# Patient Record
Sex: FEMALE | Race: BLACK OR AFRICAN AMERICAN | Employment: PART TIME | URBAN - METROPOLITAN AREA
[De-identification: names, ages, dates, MRNs, and addresses within clinical notes are randomized per-mention and may not be internally consistent; named-entity substitution may affect disease eponyms.]

---

## 2017-01-17 ENCOUNTER — LAB ONLY (OUTPATIENT)
Dept: FAMILY MEDICINE CLINIC | Age: 52
End: 2017-01-17

## 2017-01-17 ENCOUNTER — HOSPITAL ENCOUNTER (OUTPATIENT)
Dept: LAB | Age: 52
Discharge: HOME | End: 2017-01-17

## 2017-01-17 DIAGNOSIS — I10 ESSENTIAL HYPERTENSION WITH GOAL BLOOD PRESSURE LESS THAN 140/90: Primary | ICD-10-CM

## 2017-01-17 DIAGNOSIS — E55.9 VITAMIN D DEFICIENCY: ICD-10-CM

## 2017-01-17 DIAGNOSIS — E78.5 DYSLIPIDEMIA: ICD-10-CM

## 2017-01-17 DIAGNOSIS — I10 ESSENTIAL HYPERTENSION WITH GOAL BLOOD PRESSURE LESS THAN 140/90: ICD-10-CM

## 2017-01-17 LAB
ALBUMIN SERPL BCP-MCNC: 3.6 G/DL (ref 3.4–5)
ALBUMIN/GLOB SERPL: 1 {RATIO} (ref 0.8–1.7)
ALP SERPL-CCNC: 76 U/L (ref 45–117)
ALT SERPL-CCNC: 16 U/L (ref 13–56)
ANION GAP BLD CALC-SCNC: 7 MMOL/L (ref 3–18)
AST SERPL W P-5'-P-CCNC: 7 U/L (ref 15–37)
BASOPHILS # BLD AUTO: 0 K/UL (ref 0–0.1)
BASOPHILS # BLD: 0 % (ref 0–2)
BILIRUB SERPL-MCNC: 0.2 MG/DL (ref 0.2–1)
BUN SERPL-MCNC: 9 MG/DL (ref 7–18)
BUN/CREAT SERPL: 12 (ref 12–20)
CALCIUM SERPL-MCNC: 9 MG/DL (ref 8.5–10.1)
CHLORIDE SERPL-SCNC: 108 MMOL/L (ref 100–108)
CHOLEST SERPL-MCNC: 208 MG/DL
CO2 SERPL-SCNC: 26 MMOL/L (ref 21–32)
CREAT SERPL-MCNC: 0.73 MG/DL (ref 0.6–1.3)
DIFFERENTIAL METHOD BLD: ABNORMAL
EOSINOPHIL # BLD: 0.2 K/UL (ref 0–0.4)
EOSINOPHIL NFR BLD: 2 % (ref 0–5)
ERYTHROCYTE [DISTWIDTH] IN BLOOD BY AUTOMATED COUNT: 14.4 % (ref 11.6–14.5)
GLOBULIN SER CALC-MCNC: 3.5 G/DL (ref 2–4)
GLUCOSE SERPL-MCNC: 82 MG/DL (ref 74–99)
HCT VFR BLD AUTO: 36 % (ref 35–45)
HDLC SERPL-MCNC: 62 MG/DL (ref 40–60)
HDLC SERPL: 3.4 {RATIO} (ref 0–5)
HGB BLD-MCNC: 11.4 G/DL (ref 12–16)
LDLC SERPL CALC-MCNC: 118.4 MG/DL (ref 0–100)
LIPID PROFILE,FLP: ABNORMAL
LYMPHOCYTES # BLD AUTO: 22 % (ref 21–52)
LYMPHOCYTES # BLD: 1.6 K/UL (ref 0.9–3.6)
MCH RBC QN AUTO: 27.7 PG (ref 24–34)
MCHC RBC AUTO-ENTMCNC: 31.7 G/DL (ref 31–37)
MCV RBC AUTO: 87.4 FL (ref 74–97)
MONOCYTES # BLD: 0.4 K/UL (ref 0.05–1.2)
MONOCYTES NFR BLD AUTO: 5 % (ref 3–10)
NEUTS SEG # BLD: 5.3 K/UL (ref 1.8–8)
NEUTS SEG NFR BLD AUTO: 71 % (ref 40–73)
PLATELET # BLD AUTO: 293 K/UL (ref 135–420)
PMV BLD AUTO: 11.1 FL (ref 9.2–11.8)
POTASSIUM SERPL-SCNC: 4.2 MMOL/L (ref 3.5–5.5)
PROT SERPL-MCNC: 7.1 G/DL (ref 6.4–8.2)
RBC # BLD AUTO: 4.12 M/UL (ref 4.2–5.3)
SODIUM SERPL-SCNC: 141 MMOL/L (ref 136–145)
TRIGL SERPL-MCNC: 138 MG/DL (ref ?–150)
VLDLC SERPL CALC-MCNC: 27.6 MG/DL
WBC # BLD AUTO: 7.5 K/UL (ref 4.6–13.2)

## 2017-01-17 PROCEDURE — 82306 VITAMIN D 25 HYDROXY: CPT | Performed by: NURSE PRACTITIONER

## 2017-01-17 PROCEDURE — 80061 LIPID PANEL: CPT | Performed by: NURSE PRACTITIONER

## 2017-01-17 PROCEDURE — 80053 COMPREHEN METABOLIC PANEL: CPT | Performed by: NURSE PRACTITIONER

## 2017-01-17 PROCEDURE — 85025 COMPLETE CBC W/AUTO DIFF WBC: CPT | Performed by: NURSE PRACTITIONER

## 2017-01-17 NOTE — PROGRESS NOTES
Patient name, date of birth and orders verified before specimen collection. Left  arm is dry and intact. 21g butterfly  was utilized to collect specimen. Pt tolerated procedure well.

## 2017-01-18 LAB — 25(OH)D3 SERPL-MCNC: 24 NG/ML (ref 30–100)

## 2017-01-18 NOTE — PROGRESS NOTES
Will review results with pt at 1/25/17 appt  1. BMP OK   2.  LIpid panel elevated: , Trig 138, HDL 32, .4

## 2017-01-25 ENCOUNTER — OFFICE VISIT (OUTPATIENT)
Dept: FAMILY MEDICINE CLINIC | Age: 52
End: 2017-01-25

## 2017-01-25 VITALS
WEIGHT: 215.4 LBS | HEART RATE: 72 BPM | BODY MASS INDEX: 35.89 KG/M2 | OXYGEN SATURATION: 96 % | TEMPERATURE: 98.5 F | HEIGHT: 65 IN | RESPIRATION RATE: 16 BRPM | SYSTOLIC BLOOD PRESSURE: 137 MMHG | DIASTOLIC BLOOD PRESSURE: 82 MMHG

## 2017-01-25 DIAGNOSIS — Z23 ENCOUNTER FOR IMMUNIZATION: ICD-10-CM

## 2017-01-25 DIAGNOSIS — H53.8 BLURRED VISION: ICD-10-CM

## 2017-01-25 DIAGNOSIS — G47.9 SLEEPING DIFFICULTY: ICD-10-CM

## 2017-01-25 DIAGNOSIS — I10 ESSENTIAL HYPERTENSION WITH GOAL BLOOD PRESSURE LESS THAN 140/90: Primary | ICD-10-CM

## 2017-01-25 DIAGNOSIS — E66.9 OBESITY (BMI 30-39.9): ICD-10-CM

## 2017-01-25 DIAGNOSIS — E78.5 DYSLIPIDEMIA: ICD-10-CM

## 2017-01-25 DIAGNOSIS — E55.9 VITAMIN D DEFICIENCY: ICD-10-CM

## 2017-01-25 DIAGNOSIS — J45.20 WELL CONTROLLED INTERMITTENT ASTHMA: ICD-10-CM

## 2017-01-25 RX ORDER — ALBUTEROL SULFATE 90 UG/1
2 AEROSOL, METERED RESPIRATORY (INHALATION)
Qty: 3 INHALER | Refills: 3 | Status: SHIPPED | COMMUNITY
Start: 2017-01-25 | End: 2018-10-11 | Stop reason: SDUPTHER

## 2017-01-25 RX ORDER — AMLODIPINE BESYLATE AND ATORVASTATIN CALCIUM 10; 10 MG/1; MG/1
1 TABLET, FILM COATED ORAL DAILY
Qty: 90 TAB | Refills: 3 | Status: SHIPPED | COMMUNITY
Start: 2017-01-25 | End: 2018-10-11

## 2017-01-25 RX ORDER — ALBUTEROL SULFATE 90 UG/1
2 AEROSOL, METERED RESPIRATORY (INHALATION)
Qty: 6 INHALER | Refills: 3 | Status: SHIPPED | COMMUNITY
Start: 2017-01-25

## 2017-01-25 NOTE — LETTER
1/25/2017 [address] [facility] [address] josé manuel Eddy, 1965, is picking up the following medications ordered from the [facility]. 
 
SAMPLE: CADUET 10-10 MG QUANTITY: 30 & SAMPLE: VENTOLIN HFA QUANTITY: 2 Jd Gibson Patient's Signature:_________________________________________________ Today's Date: 1/25/2017

## 2017-01-25 NOTE — PROGRESS NOTES
MARILYNN LOPEZ [facility]  [address], [address]  [phone] office/[phone] fax      2017    Reason for visit:   Chief Complaint   Patient presents with    Results    Medication Evaluation     out of blood pressure meds for about 2 months. Not taking Brintellix    Immunization/Injection       Patient: Benja Shoemaker, 1965, [id number]       Primary MD: Jasper Parekh NP    Subjective: Benja Shoemaker, a 49 y.o. female, who presents for Results; Medication Evaluation (out of blood pressure meds for about 2 months. Not taking Brintellix); and Immunization/Injection      HPI    Past Medical History   Diagnosis Date    Anemia     Asthma     BMI 36.0-36.9,adult 2015    Depression     Essential hypertension with goal blood pressure less than 140/90 2016    GERD (gastroesophageal reflux disease)     H. pylori infection 4/7/15    H/O pulmonary function tests  2015      normal    Hypercholesterolemia     Hypertension     Migraine      denies    Second hand smoke exposure 2016    Sleep apnea 2016    Vitamin D deficiency 4/7/15       Past Surgical History   Procedure Laterality Date    Hx  section       x 3    Hx hysterectomy      Hx hernia repair       umbilical    Hx colonoscopy  8/26/15     Dr. Yovanny Parham, Colorectal       Social History     Social History    Marital status: SINGLE     Spouse name: N/A    Number of children: N/A    Years of education: N/A     Occupational History    Not on file.      Social History Main Topics    Smoking status: Never Smoker    Smokeless tobacco: Never Used    Alcohol use No    Drug use: No    Sexual activity: Yes     Partners: Male     Other Topics Concern    Not on file     Social History Narrative       Allergies   Allergen Reactions    Pcn [Penicillins] Rash    Adhesive Rash    Lisinopril Cough    Motrin [Ibuprofen] Shortness of Breath       Current Outpatient Prescriptions on File Prior to Visit   Medication Sig Dispense Refill    calcium combo no.2-vitamin D3 (CITRACAL + D SLOW RELEASE) 600 mg calcium- 500 unit TbER Take 1 Tab by mouth two (2) times a day. 60 Tab 11     No current facility-administered medications on file prior to visit. Review of Systems   Constitutional: Negative. HENT: Negative. Eyes: Positive for blurred vision (On and off. Has glasses but doesnt wear them ). Respiratory: Negative. Cardiovascular: Negative. Gastrointestinal: Negative. Genitourinary: Negative. Musculoskeletal: Negative. Neurological: Negative. Negative for headaches. Endo/Heme/Allergies: Negative. Psychiatric/Behavioral: Negative for depression. The patient has insomnia (Get up twice a night. Gets 6 hours of sleep ). Objective:   Visit Vitals    /82    Pulse 72    Temp 98.5 °F (36.9 °C)    Resp 16    Ht 5' 5\" (1.651 m)    Wt 215 lb 6.4 oz (97.7 kg)    SpO2 96%    BMI 35.84 kg/m2      Wt Readings from Last 3 Encounters:   01/25/17 215 lb 6.4 oz (97.7 kg)   09/15/16 200 lb (90.7 kg)   02/08/16 219 lb (99.3 kg)     Lab Results   Component Value Date/Time    Glucose 82 01/17/2017 10:31 AM    Glucose (POC) 96 06/06/2012 09:40 AM         Physical Exam   Constitutional: She is oriented to person, place, and time. She appears well-developed and well-nourished. HENT:   Head: Normocephalic. Eyes: Pupils are equal, round, and reactive to light. Neck: Normal range of motion. Neck supple. No JVD present. Carotid bruit is not present. Cardiovascular: Normal rate, regular rhythm, normal heart sounds and intact distal pulses. No murmur heard. Pulmonary/Chest: Effort normal and breath sounds normal. No respiratory distress. Abdominal: Soft. Bowel sounds are normal.   Musculoskeletal: Normal range of motion. Neurological: She is alert and oriented to person, place, and time. She has normal reflexes. Skin: Skin is warm and dry. Psychiatric: She has a normal mood and affect. Her behavior is normal.   Vitals reviewed. Assessment:    Benja Shoemaker who has risk factors including (see above previous medical hx) and:       ICD-10-CM ICD-9-CM    1. Essential hypertension with goal blood pressure less than 140/90 I10 401.9 amLODIPine-atorvastatin (CADUET) 10-10 mg per tablet      amLODIPine-atorvastatin (CADUET) 10-10 mg per tablet   2. Encounter for immunization Z23 V03.89 INFLUENZA VIRUS VAC QUAD,SPLIT,PRESV FREE SYRINGE 3/> YRS IM      VT IMMUNIZ ADMIN,1 SINGLE/COMB VAC/TOXOID   3. Obesity (BMI 30-39. 9) E66.9 278.00    4. Dyslipidemia E78.5 272.4 amLODIPine-atorvastatin (CADUET) 10-10 mg per tablet      amLODIPine-atorvastatin (CADUET) 10-10 mg per tablet   5. Blurred vision H53.8 368.8    6. Vitamin D deficiency E55.9 268.9    7. Well controlled intermittent asthma J45.20 493.90 albuterol (PROVENTIL HFA, VENTOLIN HFA, PROAIR HFA) 90 mcg/actuation inhaler      albuterol (PROVENTIL HFA, VENTOLIN HFA, PROAIR HFA) 90 mcg/actuation inhaler   8. Sleeping difficulty G47.9 780.50 diphenhydrAMINE (BENADRYL) 12.5 mg/5 mL     1. Essential hypertension with goal blood pressure less than 140/90  -The patient out of [facility] and there are no samples of it here. Will do Caduet since cholesterol slightly elevated for prevention. - amLODIPine-atorvastatin (CADUET) 10-10 mg per tablet; Take 1 Tab by mouth daily. Dispense: 90 Tab; Refill: 3  - amLODIPine-atorvastatin (CADUET) 10-10 mg per tablet; Take 1 Tab by mouth daily. Dispense: 90 Tab; Refill: 3    2. Encounter for immunization  - Influenza virus vaccine (QUADRIVALENT PRES FREE SYRINGE) IM 3 years and older  - VT IMMUNIZ ADMIN,1 SINGLE/COMB VAC/TOXOID    3.  Obesity (BMI 30-39.9)  -Weight management: Discussed importance of maintaining a normal weight through healthy dietary changes and aerobic exercise on a daily basis of 30 min or more to decrease need for additional medications, reduction of blood glucose/blood pressure/ dementia/osteoporosis/stress and depression. Aerobic exercise was described as an activity that makes them sweaty and elevates their heart rate such as walking, running, bike, treadmill, stair climbing, joining a gym or swimming. Discussed the patient's above normal BMI with her. Recommended the following interventions: dietary management education, guidance, counseling, exercise and monitoring weight. BMI is out of normal parameters and plan is as follows: Discussed in great detail on diet, portion control, exercise, avoiding foods high in sugar, carbs and starches, mealtimes and to eat until satisfied not til full. I have counseled this patient on diet and exercise regimens      4. Dyslipidemia  -Reviewed current value and goal related to prevention level, discussed dietary changes such as low saturated fats and low simple carbohydrates and choosing lean proteins such grilled/broiled/baked fish, chicken or turkey. Medications reviewed with the correct way to take them and side effects that should be reported such as muscle aches, pain, rash or shortness of breath. - amLODIPine-atorvastatin (CADUET) 10-10 mg per tablet; Take 1 Tab by mouth daily. Dispense: 90 Tab; Refill: 3  - amLODIPine-atorvastatin (CADUET) 10-10 mg per tablet; Take 1 Tab by mouth daily. Dispense: 90 Tab; Refill: 3    5. Blurred vision  -Instructed the patient to wear glasses as prescribed and follow up with optometrist if not resolved    6. Vitamin D deficiency  -Your body needs vitamin D to absorb calcium. Calcium keeps your bones and muscles, including your heart, healthy and strong. If your muscles don't get enough calcium, they can cramp, hurt, or feel weak. You may have long-term (chronic) muscle aches and pains. If you don't get enough vitamin D throughout life, you have an increased chance of having thin and brittle bones (osteoporosis) in your later years.  They also have a chance of getting a rare disease called rickets. It causes weak bones. Vitamin D and calcium are added to many foods. And your body uses sunshine to make its own vitamin D.      7. Well controlled intermittent asthma  - albuterol (PROVENTIL HFA, VENTOLIN HFA, PROAIR HFA) 90 mcg/actuation inhaler; Take 2 Puffs by inhalation every four (4) hours as needed for Wheezing. Dispense: 3 Inhaler; Refill: 3  - albuterol (PROVENTIL HFA, VENTOLIN HFA, PROAIR HFA) 90 mcg/actuation inhaler; Take 2 Puffs by inhalation every four (4) hours as needed for Wheezing. Dispense: 6 Inhaler; Refill: 3    8. Sleeping difficulty  - diphenhydrAMINE (BENADRYL) 12.5 mg/5 mL; Take 10 mL by mouth nightly as needed for Itching. Indications: INSOMNIA  Dispense: 1 Bottle; Refill: 0      Written instructions followed our verbal discussion of all information discussed above, pending tests ordered and future goals/plans. Patient expressed understanding of current diagnosis, planned testing, follow up and if needed to contact the office for any questions or concerns prior to the next visit. Plan:   Med reconciliation completed with patient. Reviewed side effects of medications with the patient. Questions were answered and patient verb understanding. Discussed lab results with patient  1. BMP OK   2. LIpid panel elevated: , Trig 138, HDL 32, .4     Orders Placed This Encounter    Influenza virus vaccine (QUADRIVALENT PRES FREE SYRINGE) IM 3 years and older    [place] IMMUNIZ ADMIN,1 SINGLE/COMB VAC/TOXOID    amLODIPine-atorvastatin (CADUET) 10-10 mg per tablet     Sig: Take 1 Tab by mouth daily. Dispense:  90 Tab     Refill:  3    amLODIPine-atorvastatin (CADUET) 10-10 mg per tablet     Sig: Take 1 Tab by mouth daily. Dispense:  90 Tab     Refill:  3    albuterol (PROVENTIL HFA, VENTOLIN HFA, PROAIR HFA) 90 mcg/actuation inhaler     Sig: Take 2 Puffs by inhalation every four (4) hours as needed for Wheezing.      Dispense:  3 Inhaler     Refill: 3    albuterol (PROVENTIL HFA, VENTOLIN HFA, PROAIR HFA) 90 mcg/actuation inhaler     Sig: Take 2 Puffs by inhalation every four (4) hours as needed for Wheezing. Dispense:  6 Inhaler     Refill:  3    diphenhydrAMINE (BENADRYL) 12.5 mg/5 mL     Sig: Take 10 mL by mouth nightly as needed for Itching. Indications: INSOMNIA     Dispense:  1 Bottle     Refill:  0     Current Outpatient Prescriptions   Medication Sig Dispense Refill    amLODIPine-atorvastatin (CADUET) 10-10 mg per tablet Take 1 Tab by mouth daily. 90 Tab 3    amLODIPine-atorvastatin (CADUET) 10-10 mg per tablet Take 1 Tab by mouth daily. 90 Tab 3    albuterol (PROVENTIL HFA, VENTOLIN HFA, PROAIR HFA) 90 mcg/actuation inhaler Take 2 Puffs by inhalation every four (4) hours as needed for Wheezing. 3 Inhaler 3    albuterol (PROVENTIL HFA, VENTOLIN HFA, PROAIR HFA) 90 mcg/actuation inhaler Take 2 Puffs by inhalation every four (4) hours as needed for Wheezing. 6 Inhaler 3    diphenhydrAMINE (BENADRYL) 12.5 mg/5 mL Take 10 mL by mouth nightly as needed for Itching. Indications: INSOMNIA 1 Bottle 0    calcium combo no.2-vitamin D3 (CITRACAL + D SLOW RELEASE) 600 mg calcium- 500 unit TbER Take 1 Tab by mouth two (2) times a day. 60 Tab 11     Medications Discontinued During This Encounter   Medication Reason    vortioxetine (BRINTELLIX) 10 mg tablet Not A Current Medication    olmesartan (BENICAR) 20 mg tablet Not A Current Medication    ergocalciferol (VITAMIN D2) 50,000 unit capsule Not A Current Medication    albuterol (PROVENTIL HFA, VENTOLIN HFA, PROAIR HFA) 90 mcg/actuation inhaler Reorder    dexlansoprazole (DEXILANT) 30 mg capsule Not A Current Medication    traZODone (DESYREL) 50 mg tablet Not A Current Medication    desloratadine (CLARINEX) 5 mg tablet Not A Current Medication       Follow-up Disposition:  Return in about 6 months (around 7/25/2017), or if symptoms worsen or fail to improve.     Labs needed for follow-up appt    \"No Show policy was reviewed with the patient. The services affected are the nurse navigator and the provider. No show appointments include missing labs for a future scheduled appointment, Pap/pelvics, arriving to appointment more than 10 minutes late, and calling to cancel appointment less than 24 hours in advance. After the 6th No Show, the patient will be removed from the [facility] to include medications for 6 months. The patient will be referred to the [place] [id number] for their primary care needs. \"     Bj Cantor, [address] I spent 30 minutes with the patient in face-to-face consultation, of which greater than 50% was spent in counseling and coordination of care as described above.

## 2017-01-25 NOTE — MR AVS SNAPSHOT
Visit Information Date & Time Provider Department Dept. Phone Encounter #  
 1/25/2017 10:00 AM Jasper Parekh NP [address] [facility] [id number] Follow-up Instructions Return in about 6 months (around 7/25/2017), or if symptoms worsen or fail to improve. Your Appointments 1/25/2017 10:00 AM  
Follow Up with Jasper Parekh NP 269[address] [facility] ([address]) Appt Note: follow up  
 [address] [facility] [address]  
[address] [facility] [address] Upcoming Health Maintenance Date Due DTaP/Tdap/Td series (1 - Tdap) 1/27/1987 FOBT Q 1 YEAR AGE 50-75 1/27/2016 INFLUENZA AGE 9 TO ADULT 1/26/2017* Pneumococcal 19-64 Medium Risk (1 of 1 - PPSV23) 1/25/2018* BREAST CANCER SCRN MAMMOGRAM 6/4/2017 PAP AKA CERVICAL CYTOLOGY 4/16/2018 *Topic was postponed. The date shown is not the original due date. Allergies as of 1/25/2017  Review Complete On: 1/25/2017 By: Vernon Paige Severity Noted Reaction Type Reactions Pcn [Penicillins] High 03/13/2012   Side Effect Rash Adhesive  01/25/2017    Rash Lisinopril  07/19/2016    Cough Motrin [Ibuprofen]  03/14/2012    Shortness of Breath Current Immunizations  Reviewed on 1/11/2016 Name Date Influenza Vaccine 1/7/2015  3:43 PM  
 Influenza Vaccine (Quad) PF  Incomplete, 1/11/2016 Not reviewed this visit You Were Diagnosed With   
  
 Codes Comments Essential hypertension with goal blood pressure less than 140/90    -  Primary ICD-10-CM: I10 
ICD-9-CM: 401.9 Encounter for immunization     ICD-10-CM: A92 ICD-9-CM: V03.89 Obesity (BMI 30-39. 9)     ICD-10-CM: E66.9 ICD-9-CM: 278.00 Dyslipidemia     ICD-10-CM: E78.5 ICD-9-CM: 272.4 Blurred vision     ICD-10-CM: H53.8 ICD-9-CM: 368.8 Vitamin D deficiency     ICD-10-CM: E55.9 ICD-9-CM: 268.9 Well controlled intermittent asthma     ICD-10-CM: J45.20 ICD-9-CM: 493.90 Sleeping difficulty     ICD-10-CM: G47.9 ICD-9-CM: 780.50 Vitals BP Pulse Temp Resp Height(growth percentile) Weight(growth percentile) 137/82 72 98.5 °F (36.9 °C) 16 5' 5\" (1.651 m) 215 lb 6.4 oz (97.7 kg) SpO2 BMI OB Status Smoking Status 96% 35.84 kg/m2 Hysterectomy Never Smoker Vitals History BMI and BSA Data Body Mass Index Body Surface Area  
 35.84 kg/m 2 2.12 m 2 Preferred Pharmacy Pharmacy Name Phone [facility] [address] [phone] Your Updated Medication List  
  
   
This list is accurate as of: 1/25/17  9:41 AM.  Always use your most recent med list.  
  
  
  
  
 * albuterol 90 mcg/actuation inhaler Commonly known as:  PROVENTIL HFA, VENTOLIN HFA, PROAIR HFA Take 2 Puffs by inhalation every four (4) hours as needed for Wheezing. * albuterol 90 mcg/actuation inhaler Commonly known as:  PROVENTIL HFA, VENTOLIN HFA, PROAIR HFA Take 2 Puffs by inhalation every four (4) hours as needed for Wheezing. * amLODIPine-atorvastatin 10-10 mg per tablet Commonly known as:  CADUET Take 1 Tab by mouth daily. * amLODIPine-atorvastatin 10-10 mg per tablet Commonly known as:  CADUET Take 1 Tab by mouth daily. calcium combo no.2-vitamin D3 600 mg calcium- 500 unit Tber Commonly known as:  CITRACAL + D SLOW RELEASE Take 1 Tab by mouth two (2) times a day. diphenhydrAMINE 12.5 mg/5 mL Commonly known as:  BENADRYL Take 10 mL by mouth nightly as needed for Itching. Indications: INSOMNIA * Notice: This list has 4 medication(s) that are the same as other medications prescribed for you. Read the directions carefully, and ask your doctor or other care provider to review them with you. Prescriptions Printed Refills  
 amLODIPine-atorvastatin (CADUET) 10-10 mg per tablet 3 Sig: Take 1 Tab by mouth daily. Class: Program  
 Route: Oral  
 albuterol (PROVENTIL HFA, VENTOLIN HFA, PROAIR HFA) 90 mcg/actuation inhaler 3 Sig: Take 2 Puffs by inhalation every four (4) hours as needed for Wheezing. Class: Program  
 Route: Inhalation Prescriptions Sent to Mail Order Refills  
 amLODIPine-atorvastatin (CADUET) 10-10 mg per tablet 3 Sig: Take 1 Tab by mouth daily. Class: Program  
 Pharmacy: [address], [address] [place] [address]. Ph #: [phone] Route: Oral  
 albuterol (PROVENTIL HFA, VENTOLIN HFA, PROAIR HFA) 90 mcg/actuation inhaler 3 Sig: Take 2 Puffs by inhalation every four (4) hours as needed for Wheezing. Class: Program  
 Pharmacy: [address], [address] [place] [address]. Ph #: [phone] Route: Inhalation Prescriptions Sent to Pharmacy Refills  
 amLODIPine-atorvastatin (CADUET) 10-10 mg per tablet 3 Sig: Take 1 Tab by mouth daily. Class: Program  
 Pharmacy: [address], [address] [place] [address]. Ph #: [phone] Route: Oral  
 albuterol (PROVENTIL HFA, VENTOLIN HFA, PROAIR HFA) 90 mcg/actuation inhaler 3 Sig: Take 2 Puffs by inhalation every four (4) hours as needed for Wheezing. Class: Program  
 Pharmacy: [address], [address] [place] [address]. Ph #: [phone] Route: Inhalation  
 diphenhydrAMINE (BENADRYL) 12.5 mg/5 mL 0 Sig: Take 10 mL by mouth nightly as needed for Itching. Indications: INSOMNIA Class: Normal  
 Pharmacy: [address], [facility]. Ph #: [phone] Route: Oral  
  
We Performed the Following INFLUENZA VIRUS VAC QUAD,SPLIT,PRESV FREE SYRINGE 3/> YRS IM [id number] CPT(R)] OR IMMUNIZ ADMIN,1 SINGLE/COMB VAC/TOXOID [id number] CPT(R)] Follow-up Instructions Return in about 6 months (around 7/25/2017), or if symptoms worsen or fail to improve. Patient Instructions High Cholesterol: Care Instructions Your Care Instructions Cholesterol is a type of fat in your blood. It is needed for many body functions, such as making new cells. Cholesterol is made by your body. It also comes from food you eat. High cholesterol means that you have too much of the fat in your blood. This raises your risk of a heart attack and stroke. LDL and HDL are part of your total cholesterol. LDL is the \"bad\" cholesterol. High LDL can raise your risk for heart disease, heart attack, and stroke. HDL is the \"good\" cholesterol. It helps clear bad cholesterol from the body. High HDL is linked with a lower risk of heart disease, heart attack, and stroke. Your cholesterol levels help your doctor find out your risk for having a heart attack or stroke. You and your doctor can talk about whether you need to lower your risk and what treatment is best for you. A heart-healthy lifestyle along with medicines can help lower your cholesterol and your risk. The way you choose to lower your risk will depend on how high your risk is for heart attack and stroke. It will also depend on how you feel about taking medicines. Follow-up care is a key part of your treatment and safety. Be sure to make and go to all appointments, and call your doctor if you are having problems. It's also a good idea to know your test results and keep a list of the medicines you take. How can you care for yourself at home? · Eat a variety of foods every day. Good choices include fruits, vegetables, whole grains (like oatmeal), dried beans and peas, nuts and seeds, soy products (like tofu), and fat-free or low-fat dairy products. · Replace butter, margarine, and hydrogenated or partially hydrogenated oils with olive and canola oils. (Canola oil margarine without trans fat is fine.) · Replace red meat with fish, poultry, and soy protein (like tofu). · Limit processed and packaged foods like chips, crackers, and cookies. · Bake, broil, or steam foods. Don't murdock them. · Be physically active. Get at least 30 minutes of exercise on most days of the week. Walking is a good choice. You also may want to do other activities, such as running, swimming, cycling, or playing tennis or team sports. · Stay at a healthy weight or lose weight by making the changes in eating and physical activity listed above. Losing just a small amount of weight, even 5 to 10 pounds, can reduce your risk for having a heart attack or stroke. · Do not smoke. When should you call for help? Watch closely for changes in your health, and be sure to contact your doctor if: 
· You need help making lifestyle changes. · You have questions about your medicine. Where can you learn more? Go to http://yaniraBobber Interactive Corporationdeysi.info/. Enter E219 in the search box to learn more about \"High Cholesterol: Care Instructions. \" Current as of: January 27, 2016 Content Version: 11.1 © 5059-2640 [employer]. Care instructions adapted under license by [employer] (which disclaims liability or warranty for this information). If you have questions about a medical condition or this instruction, always ask your healthcare professional. [address] any warranty or liability for your use of this information. The [facility] reminders! Foundation Operating Hours: These may change without notice. Mon- Wed 7am to 5pm. Closed for lunch 12-1pm 
Thurs 7am to 12pm 
Fridays closed NO SHOW POLICY ~ If a patient has 3 no shows for an appointment with the Provider, Mental Health Provider, or the Nurse Navigator in 6 months, they will be discharged from the practice for 6 months. Medication ordering will also be suspended.  If the patient is discharged from the [place], they can go to the [place] [address] where they can be seen for the primary needs plus obtain the same types of medications as they receive at the [facility]. To avoid being discharged, the patient must call the office at [phone] 24 hours prior to their appointment if they need to cancel, arrive to their appointments on time and come to all scheduled appointments. If the patient is discharged from the practice, they can apply to be re-established after 12 months. Lab work:  Unless you are instructed differently, please return to the office between the hours of 7 am and 10:30 am Monday through Thursday to have your labs drawn one week before your next scheduled PROVIDER visit. If you do not have an appointment to follow up on these results, please make one or plan to call the office if you do not hear from us to get the results. No news does not mean good news. Medications: If your medications are new or have changed, and you get your medications from the clinic pharmacy ([facility]), you MUST talk to the pharmacy staff to sign the new prescription applications. If you don't sign the applications we cannot get the medications for you. It usually takes 6-8 weeks for your medications to arrive. The Pharmacy staff will call you when your medications are available. You will have 30 days to come in and  your medications. If you don't  your medicines within those 30 days, those medicines will be placed on the self as samples and you will have to start all over again by completing the applications and waiting the 6-8 weeks for your medicines to arrive. This is firm and there will be no exceptions! ! The [facility] or [facility] will assist you with your medications if available but not all medications are available so some may be obtained through local pharmacies such as [facility] that has a large $4 list and [facility] that has many drugs for free or also for $4.  We will work hard to get the best medications for you that you can also afford. Feet Care: Local [facility] through [facility] Every second Tuesday of the month (except for holidays and election days) from 9am to 1 pm. The services provided by these ministry volunteers are free of charge with the option to donate. They will inspect your feet thoroughly, soak them for 10 minutes, cut and file your nails. They care for diabetics as well. Keep in mind this service is free and will be on a first come first serve basis. Bad teeth? Ask about the Dental Bus to get you in front of a local dentist. The bus leaves every other Wednesday for those on the list. (Ask about availability as these appointments are limited) Eye exams for Diabetics. Please let us know so we can add you to the list to see the eye doctor at [facility]. You will receive a free eye exam and free glasses if needed. Unfortunately, if you are not a diabetic, we do not have a free service for eye exams for you (yet!). We do have information on where to go to get a huge discount on eye exams and glasses. Sick visits: If you are sick and it is not an emergency call the office to see if you can schedule an appointment. Charges and cost items from the clinic:  Most of our orders are covered by [address] but there ARE SOME CHARGES for items such as radiology interpretations and anesthesiology during procedures and surgeries. Please make sure you have contacted the Advanced Patient Advocacy (APA) group to check on your payment options: [email or web address]. or come in and talk to them in person: On 
Tuesdays, we have Advanced Patient Advocacy at the Clinic from [address] - 11:30pm and 1pm - 3pm. If you can't make it to the clinic on Tuesdays during their operating hours then APA is available Mon - Fri 8-4pm at [facility] on the first floor by the information desk.  Their number is [phone]. It is important that you are screened in order to qualify for assistance and to avoid huge medical charges. The [facility] is not responsible for ANY charges you may accrue regardless of who ordered the medication, procedure, treatment or test. If you go to the Emergency Room, you WILL be charged! Behavior and emotional issues! It is stressful to be sick, have an illness, take medications, not have a job, not have medical insurance, have family issues or just getting older! Schedule an appointment with our mental health provider. She is in the office Mondays and Wednesdays from 8am to [place] [address] can also contact the following: The national suicide hotline (0-478-070-TLZN or 3-103.842.6266) [address] 
[phone] [facility] Services [facility] ([facility]) [address]  
[phone] Immunizations/Vaccination Routine Immunizations are provided for infants, children, teens, and adults at the [facility]. Please bring all immunization records with you and present them at the time of registration. Phone [phone] Hours (Walk in) Monday, Tuesday, Wednesday and Friday 8:00 AM to 11:00 AM 
12:30 PM to 3:00 PM 
 
 
Drug and Alcohol Addiction Issues! It is hard to stop a poor habit but there is help out there. Please feel free to attend any other the following support groups to help you kick the habit or go to [facility] Emergency Department to be evaluated by the psychiatric team. Never give up!! Regan meetings: Chase beard [place], [employer] [place] MONDAY 7:30 PM JUST FOR TODAY AFNAVNEET [place] [facility] [address] [facility] [place] WEDNESDAY 10:30 AM NEW BEGINN[place] AFNAVNEET [place] [employer] [facility], ROOM [address] [place] [address]  
 
[place] WEDNESDAY 8:00 PM PATRICK [place] [facility] [place] THURSDAY 8:00 PM KEEP IT SIMPLE AFG [employer] [address] 8:00 PM LET IT BEGIN WITH ME AFG [employer] [facility] [facility] [address]  
 
[place] FRIDAY 6;30 PM [place] PARENTS [employer] Parents [facility] [address]  [place] MONDAY 10:30  [place] [address] [place] SATURDAY 8:00 PM TAMIKA [facility] SATURDAY NIGHT [employer]G [employer] [place] [address] [place] MONDAY 7:00 PM MONDAY NIGHT AFG Al[employer]on LASHONDA [facility] [address]  
 
[facility] WEDNES[facility] 8:00 PM SERENITY SEEKERS [employer] Al[facility] [address] [facility] & HEALTH SERVICES! Dear Law Frye: Thank you for requesting a [employer] account. Our records indicate that you already have an active [employer] account. You can access your account anytime at [email or web address] Did you know that you can access your hospital and ER discharge instructions at any time in [employer]? You can also review all of your test results from your hospital stay or ER visit. Additional Information If you have questions, please visit the Frequently Asked Questions section of the [employer] website at [email or web address] Remember, [employer] is NOT to be used for urgent needs. For medical emergencies, dial 911. Now available from your iPhone and Android! Please provide this summary of care documentation to your next provider. Your primary care clinician is listed as Mancel [place]. If you have any questions after today's visit, please call [phone].

## 2017-02-01 ENCOUNTER — TELEPHONE (OUTPATIENT)
Dept: FAMILY MEDICINE CLINIC | Age: 52
End: 2017-02-01

## 2017-02-01 NOTE — TELEPHONE ENCOUNTER
Since pt has been taking new BP meds she has been having headaches. Been taking motrin for headaches, it works but they come back. Pt has not taken meds this morning and just has like a tension headache. Please advice.

## 2017-02-01 NOTE — TELEPHONE ENCOUNTER
We contacted patient and informed her that Ary Rodrigues would like for her to continue taking her BP meds, it could be possible that her body is getting used to taking meds again. Also informed pt to take Tylenol Headache instead of motrin because motrin can cause BP to rise.

## 2017-02-02 ENCOUNTER — TELEPHONE (OUTPATIENT)
Dept: FAMILY MEDICINE CLINIC | Age: 52
End: 2017-02-02

## 2017-02-02 ENCOUNTER — HOSPITAL ENCOUNTER (EMERGENCY)
Age: 52
Discharge: STILL ADMITTED OR ADMINISTRATIVE | End: 2017-02-02
Attending: EMERGENCY MEDICINE
Payer: SELF-PAY

## 2017-02-02 VITALS
TEMPERATURE: 98.2 F | WEIGHT: 182 LBS | SYSTOLIC BLOOD PRESSURE: 138 MMHG | OXYGEN SATURATION: 97 % | DIASTOLIC BLOOD PRESSURE: 83 MMHG | HEIGHT: 65 IN | HEART RATE: 81 BPM | RESPIRATION RATE: 20 BRPM | BODY MASS INDEX: 30.32 KG/M2

## 2017-02-02 DIAGNOSIS — G43.009 MIGRAINE WITHOUT AURA AND WITHOUT STATUS MIGRAINOSUS, NOT INTRACTABLE: Primary | ICD-10-CM

## 2017-02-02 PROCEDURE — 75810000275 HC EMERGENCY DEPT VISIT NO LEVEL OF CARE

## 2017-02-02 RX ORDER — ELETRIPTAN HYDROBROMIDE 20 MG/1
20 TABLET, FILM COATED ORAL
Qty: 90 TAB | Refills: 0 | Status: SHIPPED | COMMUNITY
Start: 2017-02-02 | End: 2018-10-11

## 2017-02-02 NOTE — ED TRIAGE NOTES
Switched to caduet  By [email or web address], having headaches since switch,  X 1 week. Waking with headache.

## 2017-02-02 NOTE — TELEPHONE ENCOUNTER
Returned call of patient for c/o of sever headaches  SUBJECTIVE:   Per Villalba is a 47 y.o. female who complains of migraine headache for 2 day(s). She has a well established history of remote migraines. Reports that she was on migraine medications in the past but doesn't remember. Description of pain: throbbing pain. Associated symptoms:none. Patient has already taken Ibuprofen for this headache with some relief. Current Outpatient Prescriptions   Medication Sig Dispense Refill    amLODIPine-atorvastatin (CADUET) 10-10 mg per tablet Take 1 Tab by mouth daily. 90 Tab 3    amLODIPine-atorvastatin (CADUET) 10-10 mg per tablet Take 1 Tab by mouth daily. 90 Tab 3    albuterol (PROVENTIL HFA, VENTOLIN HFA, PROAIR HFA) 90 mcg/actuation inhaler Take 2 Puffs by inhalation every four (4) hours as needed for Wheezing. 3 Inhaler 3    albuterol (PROVENTIL HFA, VENTOLIN HFA, PROAIR HFA) 90 mcg/actuation inhaler Take 2 Puffs by inhalation every four (4) hours as needed for Wheezing. 6 Inhaler 3    diphenhydrAMINE (BENADRYL) 12.5 mg/5 mL Take 10 mL by mouth nightly as needed for Itching. Indications: INSOMNIA 1 Bottle 0    calcium combo no.2-vitamin D3 (CITRACAL + D SLOW RELEASE) 600 mg calcium- 500 unit TbER Take 1 Tab by mouth two (2) times a day. 60 Tab 11     1. Migraine without aura and without status migrainosus, not intractable  - eletriptan (RELPAX) 20 mg tablet; Take 1 Tab by mouth once as needed for Migraine for up to 1 dose. may repeat in 2 hours if necessary  Dispense: 90 Tab; Refill: 0    ASSESSMENT:   Migraine headache    PLAN:   Treatment today -  see orders as documented in the electronic medical record. RTO prn if pain does not resolve after treatment.

## 2017-02-02 NOTE — TELEPHONE ENCOUNTER
Pt called this morning to tell me she is heading to the ER, she was recently put on a new BP medication and has been having severe migraines with lightheadedness. Messaged pts provider to inform her of the call and provider told pt to stop meds. Pt will stop meds. Expressed sympathy and concern.  Will attach note to provider. (alfonso)

## 2017-02-07 ENCOUNTER — CLINICAL SUPPORT (OUTPATIENT)
Dept: FAMILY MEDICINE CLINIC | Age: 52
End: 2017-02-07

## 2017-02-07 VITALS
SYSTOLIC BLOOD PRESSURE: 137 MMHG | HEART RATE: 75 BPM | OXYGEN SATURATION: 98 % | DIASTOLIC BLOOD PRESSURE: 83 MMHG | RESPIRATION RATE: 18 BRPM

## 2017-02-07 DIAGNOSIS — Z01.30 BLOOD PRESSURE CHECK: Primary | ICD-10-CM

## 2017-02-07 NOTE — PROGRESS NOTES
Here for blood pressure check. Reading WNL and reported to Ms Roman Malone. Patient to remain on Caduet until next visit per Ms Roman Malone. Relpax helped with previous migraine. Patient enc to see Brody for any paperwork that may need signing to obtain medications.

## 2017-02-16 ENCOUNTER — TELEPHONE (OUTPATIENT)
Dept: FAMILY MEDICINE CLINIC | Age: 52
End: 2017-02-16

## 2017-02-16 NOTE — TELEPHONE ENCOUNTER
Medication: Proventil HFA, dose: 2 puffs, how often: every 4 hours as needed for wheezing, current number of medication days provided: 90, refill per application. Lot [id number], EXP 02/2018. This medication was received and verified for the following 1. Correct Patient, 2. Correct Diagnosis, 3. Correct Drug, 4. Correct route, and no current allergy to medication. Please contact patient to come  their medications.      Cece Dos Santos MSN, RN, FNP-C     [facility]

## 2017-03-01 ENCOUNTER — TELEPHONE (OUTPATIENT)
Dept: FAMILY MEDICINE CLINIC | Age: 52
End: 2017-03-01

## 2017-03-02 NOTE — TELEPHONE ENCOUNTER
Medication: Caduet 10-10 mg, dose: 1 tab, how often: daily, current number of medication days provided: 90, refill per application. Lot #:  # [id number], EXP 04/2018. This medication was received and verified for the following 1. Correct Patient, 2. Correct Diagnosis, 3. Correct Drug, 4. Correct route, and no current allergy to medication. Please contact patient to come  their medications.      Ela Humphries MSN, RN, [facility]

## 2017-07-14 DIAGNOSIS — E78.5 DYSLIPIDEMIA: ICD-10-CM

## 2017-07-14 DIAGNOSIS — I10 ESSENTIAL HYPERTENSION WITH GOAL BLOOD PRESSURE LESS THAN 140/90: Primary | ICD-10-CM

## 2018-10-04 ENCOUNTER — HOSPITAL ENCOUNTER (OUTPATIENT)
Dept: LAB | Age: 53
Discharge: HOME | End: 2018-10-04

## 2018-10-04 ENCOUNTER — LAB ONLY (OUTPATIENT)
Dept: FAMILY MEDICINE CLINIC | Age: 53
End: 2018-10-04

## 2018-10-04 DIAGNOSIS — Z23 ENCOUNTER FOR IMMUNIZATION: ICD-10-CM

## 2018-10-04 DIAGNOSIS — I10 ESSENTIAL HYPERTENSION WITH GOAL BLOOD PRESSURE LESS THAN 140/90: ICD-10-CM

## 2018-10-04 DIAGNOSIS — E78.5 DYSLIPIDEMIA: ICD-10-CM

## 2018-10-04 DIAGNOSIS — I10 ESSENTIAL HYPERTENSION WITH GOAL BLOOD PRESSURE LESS THAN 140/90: Primary | ICD-10-CM

## 2018-10-04 LAB
ALBUMIN SERPL-MCNC: 3.4 G/DL (ref 3.4–5)
ALBUMIN/GLOB SERPL: 1 {RATIO} (ref 0.8–1.7)
ALP SERPL-CCNC: 81 U/L (ref 45–117)
ALT SERPL-CCNC: 21 U/L (ref 13–56)
ANION GAP SERPL CALC-SCNC: 7 MMOL/L (ref 3–18)
AST SERPL-CCNC: 14 U/L (ref 15–37)
BILIRUB SERPL-MCNC: 0.3 MG/DL (ref 0.2–1)
BUN SERPL-MCNC: 13 MG/DL (ref 7–18)
BUN/CREAT SERPL: 17 (ref 12–20)
CALCIUM SERPL-MCNC: 8.7 MG/DL (ref 8.5–10.1)
CHLORIDE SERPL-SCNC: 107 MMOL/L (ref 100–108)
CHOLEST SERPL-MCNC: 211 MG/DL
CO2 SERPL-SCNC: 28 MMOL/L (ref 21–32)
CREAT SERPL-MCNC: 0.78 MG/DL (ref 0.6–1.3)
GLOBULIN SER CALC-MCNC: 3.5 G/DL (ref 2–4)
GLUCOSE SERPL-MCNC: 99 MG/DL (ref 74–99)
HDLC SERPL-MCNC: 60 MG/DL (ref 40–60)
HDLC SERPL: 3.5 {RATIO} (ref 0–5)
LDLC SERPL CALC-MCNC: 130.4 MG/DL (ref 0–100)
LIPID PROFILE,FLP: ABNORMAL
POTASSIUM SERPL-SCNC: 4.2 MMOL/L (ref 3.5–5.5)
PROT SERPL-MCNC: 6.9 G/DL (ref 6.4–8.2)
SODIUM SERPL-SCNC: 142 MMOL/L (ref 136–145)
TRIGL SERPL-MCNC: 103 MG/DL (ref ?–150)
VLDLC SERPL CALC-MCNC: 20.6 MG/DL

## 2018-10-04 PROCEDURE — 80061 LIPID PANEL: CPT | Performed by: NURSE PRACTITIONER

## 2018-10-04 PROCEDURE — 80053 COMPREHEN METABOLIC PANEL: CPT | Performed by: NURSE PRACTITIONER

## 2018-10-04 NOTE — PROGRESS NOTES
Eren Alston is a 47 y.o. female who presents for routine immunizations. She denies any symptoms , reactions or allergies that would exclude them from being immunized today. Risks and adverse reactions were discussed and the VIS was given to them. All questions were addressed. She was observed for 10 min post injection. There were no reactions observed. González Guzman LPN      Verified patient name, , demographics and orders. Venipuncture for labs performed using 23G x 3/4\" butterfly Right AC using aseptic technique. Skin intact and dry. No active bleeding or complications noted. Bandaid dressing applied.

## 2018-10-11 ENCOUNTER — OFFICE VISIT (OUTPATIENT)
Dept: FAMILY MEDICINE CLINIC | Age: 53
End: 2018-10-11

## 2018-10-11 VITALS
HEART RATE: 68 BPM | HEIGHT: 65 IN | WEIGHT: 224.2 LBS | BODY MASS INDEX: 37.36 KG/M2 | OXYGEN SATURATION: 96 % | SYSTOLIC BLOOD PRESSURE: 138 MMHG | RESPIRATION RATE: 16 BRPM | DIASTOLIC BLOOD PRESSURE: 82 MMHG | TEMPERATURE: 99 F

## 2018-10-11 DIAGNOSIS — N60.01 BILATERAL BREAST CYSTS: ICD-10-CM

## 2018-10-11 DIAGNOSIS — K57.90 DIVERTICULOSIS OF INTESTINE WITHOUT BLEEDING, UNSPECIFIED INTESTINAL TRACT LOCATION: ICD-10-CM

## 2018-10-11 DIAGNOSIS — N60.02 BILATERAL BREAST CYSTS: ICD-10-CM

## 2018-10-11 DIAGNOSIS — E78.5 DYSLIPIDEMIA: ICD-10-CM

## 2018-10-11 DIAGNOSIS — J45.40 MODERATE PERSISTENT ASTHMA, UNSPECIFIED WHETHER COMPLICATED: Primary | ICD-10-CM

## 2018-10-11 RX ORDER — ALBUTEROL SULFATE 90 UG/1
2 AEROSOL, METERED RESPIRATORY (INHALATION)
Qty: 3 INHALER | Refills: 3 | Status: SHIPPED | COMMUNITY
Start: 2018-10-11

## 2018-10-11 RX ORDER — CETIRIZINE HCL 10 MG
10 TABLET ORAL DAILY
Qty: 30 TAB | Refills: 0
Start: 2018-10-11

## 2018-10-11 NOTE — MR AVS SNAPSHOT
[address] [facility] [address] 
[phone] Patient: Mj Sesay MRN: [id number] :1965 Visit Information Date & Time Provider Department Dept. Phone Encounter #  
 10/11/2018  9:30 AM Panda Weir NP  [facility] [id number] Follow-up Instructions Return in about 2 weeks (around 10/25/2018), or if symptoms worsen or fail to improve, for Pelvic exam . Your Appointments 2018  9:00 AM  
PAP/PELVIC with Panda Weir NP [address] [facility] ([address]) Appt Note: Pap/pelvic no labs [address] [facility] [address]  
[address] [facility] [address] Upcoming Health Maintenance Date Due Pneumococcal 19-64 Medium Risk (1 of 1 - PPSV23) 1985 Shingrix Vaccine Age 50> (1 of 2) 2016 FOBT Q 1 YEAR AGE 50-75 2016 BREAST CANCER SCRN MAMMOGRAM 2017 PAP AKA CERVICAL CYTOLOGY 2018 DTaP/Tdap/Td series (2 - Td) 2027 Allergies as of 10/11/2018  Review Complete On: 10/11/2018 By: Elvira Cervantes. Areli Parson LPN Severity Noted Reaction Type Reactions Pcn [Penicillins] High 2012   Side Effect Rash Adhesive  2017    Rash Lisinopril  2016    Cough Motrin [Ibuprofen]  2012    Shortness of Breath Current Immunizations  Reviewed on 10/4/2018 Name Date Influenza Vaccine 2015  3:43 PM  
 Influenza Vaccine (Quad) PF 10/4/2018  9:46 AM, 2017, 2016 Not reviewed this visit You Were Diagnosed With   
  
 Codes Comments Moderate persistent asthma, unspecified whether complicated    -  Primary ICD-10-CM: J45.40 ICD-9-CM: 493.90 Bilateral breast cysts     ICD-10-CM: N60.01, N60.02 
ICD-9-CM: 610.0 Dyslipidemia     ICD-10-CM: E78.5 ICD-9-CM: 272.4 Vitals BP Pulse Temp Resp Height(growth percentile) Weight(growth percentile) 138/82 (BP 1 Location: Left arm, BP Patient Position: Sitting) 68 99 °F (37.2 °C) (Oral) 16 5' 5\" (1.651 m) 224 lb 3.2 oz (101.7 kg) SpO2 BMI OB Status Smoking Status 96% 37.31 kg/m2 Hysterectomy Never Smoker Vitals History BMI and BSA Data Body Mass Index Body Surface Area  
 37.31 kg/m 2 2.16 m 2 Preferred Pharmacy Pharmacy Name Phone [address] [place] [address]. [phone] Your Updated Medication List  
  
   
This list is accurate as of 10/11/18  9:41 AM.  Always use your most recent med list.  
  
  
  
  
 * albuterol 90 mcg/actuation inhaler Commonly known as:  PROVENTIL HFA, VENTOLIN HFA, PROAIR HFA Take 2 Puffs by inhalation every four (4) hours as needed for Wheezing. * albuterol 90 mcg/actuation inhaler Commonly known as:  PROVENTIL HFA, VENTOLIN HFA, PROAIR HFA Take 2 Puffs by inhalation every four (4) hours as needed for Wheezing or Shortness of Breath. calcium combo no.2-vitamin D3 600 mg calcium- 500 unit Tber Commonly known as:  CITRACAL + D SLOW RELEASE Take 1 Tab by mouth two (2) times a day. inhalational spacing device 1 Each by Does Not Apply route as needed. * mometasone 220 mcg (30 doses) inhaler Commonly known as:  Nasir Crooked Take 1 Puff by inhalation daily. Indications: MAINTENANCE THERAPY FOR ASTHMA * mometasone 220 mcg (30 doses) inhaler Commonly known as:  Nasir Crooked Take 1 Puff by inhalation daily. Indications: MAINTENANCE THERAPY FOR ASTHMA * Notice: This list has 4 medication(s) that are the same as other medications prescribed for you. Read the directions carefully, and ask your doctor or other care provider to review them with you. Prescriptions Printed Refills mometasone (ASMANEX TWISTHALER) 220 mcg (30 doses) inhaler 3 Sig: Take 1 Puff by inhalation daily. Indications: MAINTENANCE THERAPY FOR ASTHMA Class: Program  
 Route: Inhalation  
 albuterol (PROVENTIL HFA, VENTOLIN HFA, PROAIR HFA) 90 mcg/actuation inhaler 3 Sig: Take 2 Puffs by inhalation every four (4) hours as needed for Wheezing or Shortness of Breath. Class: Program  
 Route: Inhalation  
 inhalational spacing device 0 Si Each by Does Not Apply route as needed. Class: Print Route: Does Not Apply Prescriptions Sent to Mail Order Refills  
 mometasone (ASMANEX TWISTHALER) 220 mcg (30 doses) inhaler 3 Sig: Take 1 Puff by inhalation daily. Indications: MAINTENANCE THERAPY FOR ASTHMA Class: Program  
 Pharmacy: [address] [place] [address], [address] [place] [address]. Ph #: [phone] Route: Inhalation  
 albuterol (PROVENTIL HFA, VENTOLIN HFA, PROAIR HFA) 90 mcg/actuation inhaler 3 Sig: Take 2 Puffs by inhalation every four (4) hours as needed for Wheezing or Shortness of Breath. Class: Program  
 Pharmacy: [address] [place], [place] [address], [address] [place] [address]. Ph #: [phone] Route: Inhalation Prescriptions Sent to Pharmacy Refills  
 mometasone (ASMANEX TWISTHALER) 220 mcg (30 doses) inhaler 3 Sig: Take 1 Puff by inhalation daily. Indications: MAINTENANCE THERAPY FOR ASTHMA Class: Program  
 Pharmacy: [address] [place] [address], [facility]. Ph #: [phone] Route: Inhalation  
 albuterol (PROVENTIL HFA, VENTOLIN HFA, PROAIR HFA) 90 mcg/actuation inhaler 3 Sig: Take 2 Puffs by inhalation every four (4) hours as needed for Wheezing or Shortness of Breath. Class: Program  
 Pharmacy: [address] [place] [address], [facility]. Ph #: [phone] Route: Inhalation Follow-up Instructions  Return in about 2 weeks (around 10/25/2018), or if symptoms worsen or fail to improve, for Pelvic exam . To-Do List   
 10/11/2018 Imaging:  [facility] MAMMO BI DX INCL CAD   
  
 10/11/2018 Imaging:  US BREASTS LIMITED=<3 QUAD   
  
 10/23/2018 10:30 AM  
  Appointment with [facility] RM 2 at [address] [place] [address] ([phone]) OUTSIDE FILMS  - Any outside films related to the study being scheduled should be brought with you on the day of the exam.  If this cannot be done there may be a delay in the reading of the study. MEDICATIONS  - Patient must bring a complete list of all medications currently taking to include prescriptions, over-the-counter meds, herbals, vitamins & any dietary supplements  GENERAL INSTRUCTIONS  - On the day of your exam do not use any bath powder, deodorant or lotions on the armpit area. 10/23/2018 11:30 AM  
  Appointment with [facility] RM 1 at [address] [place] [address] ([phone]) OUTSIDE FILMS  - Any outside films related to the study being scheduled should be brought with you on the day of the exam.  If this cannot be done there may be a delay in the reading of the study. MEDICATIONS  - Patient must bring a complete list of all medications currently taking to include prescriptions, over-the-counter meds, herbals, vitamins & any dietary supplements Introducing [facility] [place] [facility]! Dear Susi Bolton: Thank you for requesting a elmenus account. Our records indicate that you already have an active elmenus account. You can access your account anytime at [email or web address] Did you know that you can access your hospital and ER discharge instructions at any time in elmenus? You can also review all of your test results from your hospital stay or ER visit. Additional Information If you have questions, please visit the Frequently Asked Questions section of the elmenus website at [email or web address] Remember, Whatâ€™s More Alive Than You[employer] is NOT to be used for urgent needs. For medical emergencies, dial 911. Now available from your iPhone and Android! Please provide this summary of care documentation to your next provider. Your primary care clinician is listed as Lewis Murry. If you have any questions after today's visit, please call [phone].

## 2018-10-11 NOTE — PROGRESS NOTES
MARILYNN DEE [facility]  [address] [facility]  [phone] office/[phone] fax      10/11/2018    Reason for visit:   Chief Complaint   Patient presents with    Follow Up Chronic Condition    Asthma     Patient states that albuterol inhaler is not effective and she needs the     Breast Cyst     Patient states needs 6 month follow up ultrasound this month post abnormal mammgram 6 months ago       Patient: Tabatha Petersen, 1965, [id number]       Primary MD: Maria Guadalupe Wang NP    Subjective: Tabatha Petersen, a 47 y.o. female, who presents for Follow Up Chronic Condition. She was getting routine care at [facility] in [place], [place] called [facility] and she was taken off her BP medications 1 year ago and has been stable. HPI   Asthma Review:  The patient is being seen for follow up of asthma, not currently in exacerbation. Asthma symptoms occur: more than once daily, and nightly symptoms. Wheezing when present is described as moderate and easily relieved with rescue bronchodilator. Current limitations in activity from asthma: none. Number of days of school or work missed in the last month: 0. Frequency of use of quick-relief meds: Daily and nightly. Regimen compliance: The patient reports adherence to this regimen. Patient does not smoke cigarettes. However she is around second hand smoke from her . She is currently on rescue inhalers only and not on any maintenance inhalers. She reports needing a spacer to help her with inhaler use as she does not get proper relief without it.      Past Medical History:   Diagnosis Date    Anemia     Asthma     BMI 36.0-36.9,adult 4/16/2015    Depression     Essential hypertension with goal blood pressure less than 140/90 5/5/2016    GERD (gastroesophageal reflux disease)     H. pylori infection 4/7/15    H/O pulmonary function tests  January 2015     normal    Hypercholesterolemia     Hypertension     Migraine     denies    Second hand smoke exposure 2016    Sleep apnea 2016    Vitamin D deficiency 4/7/15       Past Surgical History:   Procedure Laterality Date    HX  SECTION      x 3    HX COLONOSCOPY  8/26/15    Dr. Jakob Godoy, Colorectal    HX HERNIA REPAIR      umbilical    HX HYSTERECTOMY         Social History     Social History    Marital status: SINGLE     Spouse name: N/A    Number of children: N/A    Years of education: N/A     Occupational History    Not on file. Social History Main Topics    Smoking status: Never Smoker    Smokeless tobacco: Never Used    Alcohol use No    Drug use: No    Sexual activity: Yes     Partners: Male     Other Topics Concern    Not on file     Social History Narrative       Allergies   Allergen Reactions    Pcn [Penicillins] Rash    Adhesive Rash    Lisinopril Cough    Motrin [Ibuprofen] Shortness of Breath       Current Outpatient Prescriptions on File Prior to Visit   Medication Sig Dispense Refill    albuterol (PROVENTIL HFA, VENTOLIN HFA, PROAIR HFA) 90 mcg/actuation inhaler Take 2 Puffs by inhalation every four (4) hours as needed for Wheezing. 6 Inhaler 3    calcium combo no.2-vitamin D3 (CITRACAL + D SLOW RELEASE) 600 mg calcium- 500 unit TbER Take 1 Tab by mouth two (2) times a day. 60 Tab 11     No current facility-administered medications on file prior to visit. Review of Systems   Constitutional: Negative. HENT: Positive for congestion. Negative for ear discharge, ear pain, hearing loss, nosebleeds, sinus pain, sore throat and tinnitus. Respiratory: Positive for shortness of breath and wheezing. Negative for cough, hemoptysis, sputum production and stridor. Cardiovascular: Negative. Gastrointestinal: Positive for abdominal pain (\"I get abdominal pain after eating certain foods\"). Negative for blood in stool, constipation, diarrhea, heartburn, melena, nausea and vomiting. Genitourinary: Negative. Skin: Negative. Neurological: Negative. Psychiatric/Behavioral: Negative. Objective:   Visit Vitals    /82 (BP 1 Location: Left arm, BP Patient Position: Sitting)    Pulse 68    Temp 99 °F (37.2 °C) (Oral)    Resp 16    Ht 5' 5\" (1.651 m)    Wt 224 lb 3.2 oz (101.7 kg)    SpO2 96%    BMI 37.31 kg/m2      Wt Readings from Last 3 Encounters:   10/11/18 224 lb 3.2 oz (101.7 kg)   02/02/17 182 lb (82.6 kg)   01/25/17 215 lb 6.4 oz (97.7 kg)     Lab Results   Component Value Date/Time    Glucose 99 10/04/2018 09:42 AM    Glucose, POC 96 06/06/2012 09:40 AM       Pertinent Lab Results:    Physical Exam   Constitutional: She is oriented to person, place, and time. Vital signs are normal. She appears well-developed and well-nourished. HENT:   Head: Normocephalic. Right Ear: Hearing, tympanic membrane, external ear and ear canal normal.   Left Ear: Hearing, tympanic membrane, external ear and ear canal normal.   Nose: Mucosal edema present. No rhinorrhea. Mouth/Throat: Dental caries present. Slight right ear irritation    Eyes: Pupils are equal, round, and reactive to light. Neck: Normal range of motion. Neck supple. No JVD present. Carotid bruit is not present. Cardiovascular: Normal rate, regular rhythm, normal heart sounds and intact distal pulses. No murmur heard. Pulmonary/Chest: Effort normal and breath sounds normal. No respiratory distress. She has no wheezes. Abdominal: Soft. She exhibits distension. Bowel sounds are decreased. There is no tenderness. Musculoskeletal: Normal range of motion. Neurological: She is alert and oriented to person, place, and time. She has normal reflexes. Skin: Skin is warm and dry. Psychiatric: She has a normal mood and affect. Her behavior is normal.   Vitals reviewed. ICD-10-CM ICD-9-CM    1.  Moderate persistent asthma, unspecified whether complicated S55.40 659.54 [facility] TWISTHALER) 220 mcg (30 doses) inhaler      mometasone (ASMANEX TWISTHALER) 220 mcg (30 doses) inhaler      albuterol (PROVENTIL HFA, VENTOLIN HFA, PROAIR HFA) 90 mcg/actuation inhaler      inhalational spacing device      cetirizine (ZYRTEC) 10 mg tablet   2. Bilateral breast cysts N60.01 610.0 SHIELA MAMMO BI DX INCL CAD    N60.02  US BREASTS LIMITED=<3 QUAD   3. Dyslipidemia E78.5 272.4 LIPID PANEL      METABOLIC PANEL, COMPREHENSIVE   4. Diverticulosis of intestine without bleeding, unspecified intestinal tract location K57.90 562.10     Per previous CT of abdomen      Diagnoses and all orders for this visit:    1. Moderate persistent asthma, unspecified whether complicated  -     mometasone (ASMANEX TWISTHALER) 220 mcg (30 doses) inhaler; Take 1 Puff by inhalation daily. Indications: MAINTENANCE THERAPY FOR ASTHMA  -     mometasone (ASMANEX TWISTHALER) 220 mcg (30 doses) inhaler; Take 1 Puff by inhalation daily. Indications: MAINTENANCE THERAPY FOR ASTHMA  -     albuterol (PROVENTIL HFA, VENTOLIN HFA, PROAIR HFA) 90 mcg/actuation inhaler; Take 2 Puffs by inhalation every four (4) hours as needed for Wheezing or Shortness of Breath. -     inhalational spacing device; 1 Each by Does Not Apply route as needed. -     cetirizine (ZYRTEC) 10 mg tablet; Take 1 Tab by mouth daily. 2. Bilateral breast cysts  -     SHIELA MAMMO BI DX INCL CAD; Future  -     US BREASTS LIMITED=<3 QUAD (Bilateral); Future    3. Dyslipidemia   - Reviewed diet and exercise with patient.      4. Diverticulosis of intestine without bleeding, unspecified intestinal tract location  Comments:  Per previous CT of abdomen   - Instructed to increase fiber in diet       Follow-up Disposition:  Return in about 2 weeks (around 10/25/2018), or if symptoms worsen or fail to improve, for Pelvic exam .      Medications Discontinued During This Encounter   Medication Reason    amLODIPine-atorvastatin (CADUET) 10-10 mg per tablet Not A Current Medication    amLODIPine-atorvastatin (CADUET) 10-10 mg per tablet Not A Current Medication    eletriptan (RELPAX) 20 mg tablet Not A Current Medication    diphenhydrAMINE (BENADRYL) 12.5 mg/5 mL Not A Current Medication    albuterol (PROVENTIL HFA, VENTOLIN HFA, PROAIR HFA) 90 mcg/actuation inhaler Reorder

## 2018-10-19 ENCOUNTER — HOSPITAL ENCOUNTER (EMERGENCY)
Age: 53
Discharge: HOME | End: 2018-10-19
Attending: EMERGENCY MEDICINE
Payer: SELF-PAY

## 2018-10-19 VITALS
OXYGEN SATURATION: 98 % | SYSTOLIC BLOOD PRESSURE: 152 MMHG | TEMPERATURE: 98.1 F | DIASTOLIC BLOOD PRESSURE: 80 MMHG | RESPIRATION RATE: 16 BRPM | HEART RATE: 81 BPM

## 2018-10-19 DIAGNOSIS — H10.32 ACUTE CONJUNCTIVITIS OF LEFT EYE, UNSPECIFIED ACUTE CONJUNCTIVITIS TYPE: Primary | ICD-10-CM

## 2018-10-19 PROCEDURE — 99282 EMERGENCY DEPT VISIT SF MDM: CPT

## 2018-10-19 RX ORDER — POLYMYXIN B SULFATE AND TRIMETHOPRIM 1; 10000 MG/ML; [USP'U]/ML
1 SOLUTION OPHTHALMIC EVERY 6 HOURS
Qty: 10 ML | Refills: 0 | Status: SHIPPED | OUTPATIENT
Start: 2018-10-19 | End: 2018-10-26

## 2018-10-19 NOTE — ED PROVIDER NOTES
EMERGENCY DEPARTMENT HISTORY AND PHYSICAL EXAM 
 
Date: 10/19/2018 Patient Name: Jorge Anthony History of Presenting Illness Chief Complaint Patient presents with [address] History Provided By: Patient Chief Complaint:  left eye burning, itching, redness, and mild discharge Duration: 1day Timing:  Acute Location: eye Quality: Burning and itchy Severity: Moderate Modifying Factors: none Associated Symptoms: none Additional History (Context): Jorge Anthony is a 47 y.o. female with note medical hx who presents with left eye burning, itching, redness, and mild discharge that started last night. Pt states the redness had resolved this on it own this morning but today at work she was still having the itchiness, burning, and white discharge from the eye. Has not did anything at home to treat her sx. Supposed to be wearing glasses. No one at home with similar sx. Denies fever, chills, swelling, injury/trauma, visual changes, or any other associated sx. No other complaints or concerns. Denies contact lens wear. PCP: Felicitas Mantilla NP Current Outpatient Medications Medication Sig Dispense Refill  trimethoprim-polymyxin b (POLYTRIM) ophthalmic solution Administer 1 Drop to left eye every six (6) hours for 7 days. 10 mL 0  
 mometasone (ASMANEX TWISTHALER) 220 mcg (30 doses) inhaler Take 1 Puff by inhalation daily. Indications: MAINTENANCE THERAPY FOR ASTHMA 30 Cap 3  
 mometasone (ASMANEX TWISTHALER) 220 mcg (30 doses) inhaler Take 1 Puff by inhalation daily. Indications: MAINTENANCE THERAPY FOR ASTHMA 30 Cap 0  
 albuterol (PROVENTIL HFA, VENTOLIN HFA, PROAIR HFA) 90 mcg/actuation inhaler Take 2 Puffs by inhalation every four (4) hours as needed for Wheezing or Shortness of Breath. 3 Inhaler 3  
 inhalational spacing device 1 Each by Does Not Apply route as needed. 1 Device 0  
 cetirizine (ZYRTEC) 10 mg tablet Take 1 Tab by mouth daily.  30 Tab 0  
  albuterol (PROVENTIL HFA, VENTOLIN HFA, PROAIR HFA) 90 mcg/actuation inhaler Take 2 Puffs by inhalation every four (4) hours as needed for Wheezing. 6 Inhaler 3  
 calcium combo no.2-vitamin D3 (CITRACAL + D SLOW RELEASE) 600 mg calcium- 500 unit TbER Take 1 Tab by mouth two (2) times a day. 60 Tab 11 Past History Past Medical History: 
Past Medical History:  
Diagnosis Date  Anemia  Asthma  BMI 36.0-36.9,adult 2015  Depression  Essential hypertension with goal blood pressure less than 140/90 2016  GERD (gastroesophageal reflux disease)  H. pylori infection 4/7/15  
 H/O pulmonary function tests  2015  
  normal  
 Hypercholesterolemia  Hypertension  Migraine   
 denies  Second hand smoke exposure 2016  Sleep apnea 2016  Vitamin D deficiency 4/7/15 Past Surgical History: 
Past Surgical History:  
Procedure Laterality Date  HX  SECTION    
 x 3  
 HX COLONOSCOPY  8/26/15 Dr. Cory Remy, Colorectal  
 HX HERNIA REPAIR    
 umbilical  
 HX HYSTERECTOMY Family History: No family history on file. Social History: 
Social History Tobacco Use  Smoking status: Never Smoker  Smokeless tobacco: Never Used Substance Use Topics  Alcohol use: No  
 Drug use: No  
 
 
Allergies: Allergies Allergen Reactions  Pcn [Penicillins] Rash  Adhesive Rash  Lisinopril Cough  Motrin [Ibuprofen] Shortness of Breath Review of Systems Review of Systems Constitutional: Negative for chills and fever. Eyes: Positive for pain, discharge, redness and itching. Negative for photophobia and visual disturbance. Respiratory: Negative for shortness of breath. Cardiovascular: Negative for chest pain. Gastrointestinal: Negative for abdominal pain. Musculoskeletal: Negative for neck pain. All other systems reviewed and are negative. All Other Systems Negative Physical Exam  
 
Vitals: 10/19/18 1021 BP: 152/80 Pulse: 81 Resp: 16 Temp: 98.1 °F (36.7 °C) SpO2: 98% Physical Exam  
Constitutional: She is oriented to person, place, and time. She appears well-developed and well-nourished. No distress. HENT:  
Head: Normocephalic and atraumatic. Eyes: EOM and lids are normal. Pupils are equal, round, and reactive to light. Lids are everted and swept, no foreign bodies found. Right eye exhibits no chemosis, no discharge, no exudate and no hordeolum. No foreign body present in the right eye. Left eye exhibits no chemosis, no discharge, no exudate and no hordeolum. No foreign body present in the left eye. Scant amount of redness noted to the left eye Neck: Normal range of motion. Neck supple. Cardiovascular: Normal rate, regular rhythm and normal heart sounds. Pulmonary/Chest: Effort normal and breath sounds normal. No respiratory distress. She exhibits no tenderness. Abdominal: Soft. Bowel sounds are normal. She exhibits no distension. There is no tenderness. There is no rebound and no guarding. Musculoskeletal: She exhibits no edema or deformity. Neurological: She is alert and oriented to person, place, and time. She has normal reflexes. Skin: Skin is warm and dry. She is not diaphoretic. Psychiatric: She has a normal mood and affect. Nursing note and vitals reviewed. Diagnostic Study Results Labs - No results found for this or any previous visit (from the past 12 hour(s)). Radiologic Studies - No orders to display CT Results  (Last 48 hours) None CXR Results  (Last 48 hours) None Medical Decision Making I am the first provider for this patient. I reviewed the vital signs, available nursing notes, past medical history, past surgical history, family history and social history. Vital Signs-Reviewed the patient's vital signs.  
 
 
Pulse Oximetry Analysis -  100 % RA 
 
 Records Reviewed: Nursing Notes and Old Medical Records Procedures: None Procedures Provider Notes (Medical Decision Making):  
 
Differential Diagnosis: Viral/bacterial/allergic conjunctivitis, hordeolum, chalazion Plan: Given patient history and concerns will treat for bacterial conjunctivitis, follow up with PCP in two days as needed will discharge home with abx, Patient agrees with the plan and management and states all questions have been thoroughly answered and there are no more remaining questions. MED RECONCILIATION: 
No current facility-administered medications for this encounter. Current Outpatient Medications Medication Sig  
 trimethoprim-polymyxin b (POLYTRIM) ophthalmic solution Administer 1 Drop to left eye every six (6) hours for 7 days.  mometasone (ASMANEX TWISTHALER) 220 mcg (30 doses) inhaler Take 1 Puff by inhalation daily. Indications: MAINTENANCE THERAPY FOR ASTHMA  mometasone (ASMANEX TWISTHALER) 220 mcg (30 doses) inhaler Take 1 Puff by inhalation daily. Indications: MAINTENANCE THERAPY FOR ASTHMA  albuterol (PROVENTIL HFA, VENTOLIN HFA, PROAIR HFA) 90 mcg/actuation inhaler Take 2 Puffs by inhalation every four (4) hours as needed for Wheezing or Shortness of Breath.  inhalational spacing device 1 Each by Does Not Apply route as needed.  cetirizine (ZYRTEC) 10 mg tablet Take 1 Tab by mouth daily.  albuterol (PROVENTIL HFA, VENTOLIN HFA, PROAIR HFA) 90 mcg/actuation inhaler Take 2 Puffs by inhalation every four (4) hours as needed for Wheezing.  calcium combo no.2-vitamin D3 (CITRACAL + D SLOW RELEASE) 600 mg calcium- 500 unit TbER Take 1 Tab by mouth two (2) times a day. Disposition: 
Home DISCHARGE NOTE:  
Pt has been reexamined. Patient has no new complaints, changes, or physical findings. Care plan outlined and precautions discussed. Results of workup were reviewed with the patient.  All medications were reviewed with the patient. All of pt's questions and concerns were addressed. Patient was instructed and agrees to follow up with PCP, as well as to return to the ED upon further deterioration. Patient is ready to go home. Follow-up Information Follow up With Specialties Details Why Contact Info [facility] EMERGENCY DEPT Emergency Medicine  As needed [address] [id number] 
[phone] Dominguez Brian, M[place] Nurse Practitioner In 2 days As needed [address] [facility] 
[phone] Current Discharge Medication List  
  
START taking these medications Details  
trimethoprim-polymyxin b (POLYTRIM) ophthalmic solution Administer 1 Drop to left eye every six (6) hours for 7 days. Qty: 10 mL, Refills: 0 Diagnosis Clinical Impression: 1. Acute conjunctivitis of left eye, unspecified acute conjunctivitis type Scribe Attestation Jenna Saavedra acting as a scribe for and in the presence of [place], [place] October 19, 2018 at 11:41 AM 
    
Provider Attestation:     
I personally performed the services described in the documentation, reviewed the documentation, as recorded by the scribe in my presence, and it accurately and completely records my words and actions.  October 19, 2018 at 11:41 AM - [place], [place]

## 2018-10-19 NOTE — ED TRIAGE NOTES
\"Since yesterday my left eye has been burning and itching. Today while I was at work my eye started draining. \"

## 2018-10-19 NOTE — DISCHARGE INSTRUCTIONS
Pinkeye: Care Instructions  Your Care Instructions    Pinkeye is redness and swelling of the eye surface and the conjunctiva (the lining of the eyelid and the covering of the white part of the eye). Pinkeye is also called conjunctivitis. Pinkeye is often caused by infection with bacteria or a virus. Dry air, allergies, smoke, and chemicals are other common causes. Pinkeye often clears on its own in 7 to 10 days. Antibiotics only help if the pinkeye is caused by bacteria. Pinkeye caused by infection spreads easily. If an allergy or chemical is causing pinkeye, it will not go away unless you can avoid whatever is causing it. Follow-up care is a key part of your treatment and safety. Be sure to make and go to all appointments, and call your doctor if you are having problems. It's also a good idea to know your test results and keep a list of the medicines you take. How can you care for yourself at home? · Wash your hands often. Always wash them before and after you treat pinkeye or touch your eyes or face. · Use moist cotton or a clean, wet cloth to remove crust. Wipe from the inside corner of the eye to the outside. Use a clean part of the cloth for each wipe. · Put cold or warm wet cloths on your eye a few times a day if the eye hurts. · Do not wear contact lenses or eye makeup until the pinkeye is gone. Throw away any eye makeup you were using when you got pinkeye. Clean your contacts and storage case. If you wear disposable contacts, use a new pair when your eye has cleared and it is safe to wear contacts again. · If the doctor gave you antibiotic ointment or eyedrops, use them as directed. Use the medicine for as long as instructed, even if your eye starts looking better soon. Keep the bottle tip clean, and do not let it touch the eye area. · To put in eyedrops or ointment:  ? Tilt your head back, and pull your lower eyelid down with one finger. ?  Drop or squirt the medicine inside the lower lid.  ? Close your eye for 30 to 60 seconds to let the drops or ointment move around. ? Do not touch the ointment or dropper tip to your eyelashes or any other surface. · Do not share towels, pillows, or washcloths while you have pinkeye. When should you call for help? Call your doctor now or seek immediate medical care if:    · You have pain in your eye, not just irritation on the surface.     · You have a change in vision or loss of vision.     · You have an increase in discharge from the eye.     · Your eye has not started to improve or begins to get worse within 48 hours after you start using antibiotics.     · Pinkeye lasts longer than 7 days.    Watch closely for changes in your health, and be sure to contact your doctor if you have any problems. Where can you learn more? Go to http://yanira-deysi.info/. Enter Y392 in the search box to learn more about \"Pinkeye: Care Instructions. \"  Current as of: November 20, 2017  Content Version: 11.8  © 3466-5419 [employer]. Care instructions adapted under license by [employer] (which disclaims liability or warranty for this information). If you have questions about a medical condition or this instruction, always ask your healthcare professional. [address] any warranty or liability for your use of this information.

## 2018-10-19 NOTE — LETTER
[address] [place] [address] [address] [facility] EMERGENCY DEPT 
[address] [facility] [id number] [phone] Work/School Note Date: 10/19/2018 To Whom It May concern: Emigdio Quiroga was seen and treated today in the emergency room by the following provider(s): 
Attending Provider: Trever Henderson MD 
Physician Assistant: Simon Beltran. Emigdio Quiroga may return to work on 10/20/18 Sincerely, Simon Colvin

## 2018-10-23 ENCOUNTER — HOSPITAL ENCOUNTER (OUTPATIENT)
Dept: MAMMOGRAPHY | Age: 53
Discharge: HOME | End: 2018-10-23
Attending: NURSE PRACTITIONER

## 2018-10-23 DIAGNOSIS — N60.01 BILATERAL BREAST CYSTS: ICD-10-CM

## 2018-10-23 DIAGNOSIS — N60.02 BILATERAL BREAST CYSTS: ICD-10-CM

## 2018-11-01 ENCOUNTER — HOSPITAL ENCOUNTER (OUTPATIENT)
Dept: LAB | Age: 53
Discharge: HOME | End: 2018-11-01

## 2018-11-01 ENCOUNTER — OFFICE VISIT (OUTPATIENT)
Dept: FAMILY MEDICINE CLINIC | Age: 53
End: 2018-11-01

## 2018-11-01 VITALS
RESPIRATION RATE: 12 BRPM | BODY MASS INDEX: 36.85 KG/M2 | HEIGHT: 65 IN | OXYGEN SATURATION: 99 % | HEART RATE: 64 BPM | SYSTOLIC BLOOD PRESSURE: 139 MMHG | WEIGHT: 221.2 LBS | TEMPERATURE: 98.2 F | DIASTOLIC BLOOD PRESSURE: 82 MMHG

## 2018-11-01 DIAGNOSIS — Z01.419 WELL WOMAN EXAM WITH ROUTINE GYNECOLOGICAL EXAM: ICD-10-CM

## 2018-11-01 DIAGNOSIS — Z12.39 BREAST CANCER SCREENING: ICD-10-CM

## 2018-11-01 DIAGNOSIS — Z01.419 WELL WOMAN EXAM WITH ROUTINE GYNECOLOGICAL EXAM: Primary | ICD-10-CM

## 2018-11-01 LAB
HEMOCCULT STL QL: NEGATIVE
VALID INTERNAL CONTROL?: YES

## 2018-11-01 PROCEDURE — 88142 CYTOPATH C/V THIN LAYER: CPT | Performed by: NURSE PRACTITIONER

## 2018-11-01 PROCEDURE — 87491 CHLMYD TRACH DNA AMP PROBE: CPT | Performed by: NURSE PRACTITIONER

## 2018-11-01 NOTE — PROGRESS NOTES
No complaints of discharge. Patient not sexually active at this time, last encounter was about 6 months ago. Patient did not have mammogram done until  receive copy of last mammogram from [place].

## 2018-11-01 NOTE — PATIENT INSTRUCTIONS
Please get mammogram records sent to office for review, so you can follow up with 6 month mammogram     Well Visit, Women 50 to 72: Care Instructions  Your Care Instructions    Physical exams can help you stay healthy. Your doctor has checked your overall health and may have suggested ways to take good care of yourself. He or she also may have recommended tests. At home, you can help prevent illness with healthy eating, regular exercise, and other steps. Follow-up care is a key part of your treatment and safety. Be sure to make and go to all appointments, and call your doctor if you are having problems. It's also a good idea to know your test results and keep a list of the medicines you take. How can you care for yourself at home? · Reach and stay at a healthy weight. This will lower your risk for many problems, such as obesity, diabetes, heart disease, and high blood pressure. · Get at least 30 minutes of exercise on most days of the week. Walking is a good choice. You also may want to do other activities, such as running, swimming, cycling, or playing tennis or team sports. · Do not smoke. Smoking can make health problems worse. If you need help quitting, talk to your doctor about stop-smoking programs and medicines. These can increase your chances of quitting for good. · Protect your skin from too much sun. When you're outdoors from 10 a.m. to 4 p.m., stay in the shade or cover up with clothing and a hat with a wide brim. Wear sunglasses that block UV rays. Even when it's cloudy, put broad-spectrum sunscreen (SPF 30 or higher) on any exposed skin. · See a dentist one or two times a year for checkups and to have your teeth cleaned. · Wear a seat belt in the car. · Limit alcohol to 1 drink a day. Too much alcohol can cause health problems. Follow your doctor's advice about when to have certain tests. These tests can spot problems early. · Cholesterol.  Your doctor will tell you how often to have this done based on your age, family history, or other things that can increase your risk for heart attack and stroke. · Blood pressure. Have your blood pressure checked during a routine doctor visit. Your doctor will tell you how often to check your blood pressure based on your age, your blood pressure results, and other factors. · Mammogram. Ask your doctor how often you should have a mammogram, which is an X-ray of your breasts. A mammogram can spot breast cancer before it can be felt and when it is easiest to treat. · Pap test and pelvic exam. Ask your doctor how often you should have a Pap test. You may not need to have a Pap test as often as you used to. · Vision. Have your eyes checked every year or two or as often as your doctor suggests. Some experts recommend that you have yearly exams for glaucoma and other age-related eye problems starting at age 48. · Hearing. Tell your doctor if you notice any change in your hearing. You can have tests to find out how well you hear. · Diabetes. Ask your doctor whether you should have tests for diabetes. · Colon cancer. You should begin tests for colon cancer at age 48. You may have one of several tests. Your doctor will tell you how often to have tests based on your age and risk. Risks include whether you already had a precancerous polyp removed from your colon or whether your parents, sisters and brothers, or children have had colon cancer. · Thyroid disease. Talk to your doctor about whether to have your thyroid checked as part of a regular physical exam. Women have an increased chance of a thyroid problem. · Osteoporosis. You should begin tests for bone density at age 72. If you are younger than 72, ask your doctor whether you have factors that may increase your risk for this disease. You may want to have this test before age 72. · Heart attack and stroke risk. At least every 4 to 6 years, you should have your risk for heart attack and stroke assessed.  Your doctor uses factors such as your age, blood pressure, cholesterol, and whether you smoke or have diabetes to show what your risk for a heart attack or stroke is over the next 10 years. When should you call for help? Watch closely for changes in your health, and be sure to contact your doctor if you have any problems or symptoms that concern you. Where can you learn more? Go to http://yanira-deysi.info/. Enter Q943 in the search box to learn more about \"Well Visit, Women 50 to 72: Care Instructions. \"  Current as of: March 29, 2018  Content Version: 11.8  © 9794-9123 [employer]. Care instructions adapted under license by [employer] (which disclaims liability or warranty for this information). If you have questions about a medical condition or this instruction, always ask your healthcare professional. [address] any warranty or liability for your use of this information.

## 2018-11-01 NOTE — PROGRESS NOTES
Subjective:   47 y.o. female for Well Woman Check. She is postmenopausal.  Social History: single partner, contraception - none. Pertinent past medical hstory: no history of HTN, DVT, CAD, DM, liver disease, migraines or smoking. Patient Active Problem List    Diagnosis Date Noted    Second hand smoke exposure 07/19/2016    Sleep apnea 07/19/2016    Essential hypertension with goal blood pressure less than 140/90 05/05/2016    Dyslipidemia 01/11/2016    Asthma 01/11/2016    Calcaneal spur 11/27/2015    Vitamin D deficiency 09/05/2015    Rectal bleeding 07/30/2015    Anal itching 07/30/2015    BMI 36.0-36.9,adult 04/16/2015    Abdominal pain 09/22/2014    Allergic dermatitis 08/20/2012    GERD (gastroesophageal reflux disease) 08/20/2012    Depression 08/20/2012    Ovarian cyst 03/13/2012     Current Outpatient Medications   Medication Sig Dispense Refill    mometasone (ASMANEX TWISTHALER) 220 mcg (30 doses) inhaler Take 1 Puff by inhalation daily. Indications: MAINTENANCE THERAPY FOR ASTHMA 30 Cap 3    mometasone (ASMANEX TWISTHALER) 220 mcg (30 doses) inhaler Take 1 Puff by inhalation daily. Indications: MAINTENANCE THERAPY FOR ASTHMA 30 Cap 0    albuterol (PROVENTIL HFA, VENTOLIN HFA, PROAIR HFA) 90 mcg/actuation inhaler Take 2 Puffs by inhalation every four (4) hours as needed for Wheezing or Shortness of Breath. 3 Inhaler 3    inhalational spacing device 1 Each by Does Not Apply route as needed. 1 Device 0    cetirizine (ZYRTEC) 10 mg tablet Take 1 Tab by mouth daily. 30 Tab 0    albuterol (PROVENTIL HFA, VENTOLIN HFA, PROAIR HFA) 90 mcg/actuation inhaler Take 2 Puffs by inhalation every four (4) hours as needed for Wheezing. 6 Inhaler 3    calcium combo no.2-vitamin D3 (CITRACAL + D SLOW RELEASE) 600 mg calcium- 500 unit TbER Take 1 Tab by mouth two (2) times a day.  60 Tab 11     Allergies   Allergen Reactions    Pcn [Penicillins] Rash    Adhesive Rash    Lisinopril Cough    Motrin [Ibuprofen] Shortness of Breath     Past Medical History:   Diagnosis Date    Anemia     Asthma     BMI 36.0-36.9,adult 2015    Depression     Essential hypertension with goal blood pressure less than 140/90 2016    GERD (gastroesophageal reflux disease)     H. pylori infection 4/7/15    H/O pulmonary function tests  2015     normal    Hypercholesterolemia     Hypertension     Migraine     denies    Second hand smoke exposure 2016    Sleep apnea 2016    Vitamin D deficiency 4/7/15     Past Surgical History:   Procedure Laterality Date    HX  SECTION      x 3    HX COLONOSCOPY  8/26/15    Dr. Krunal Farnk, Colorectal    HX HERNIA REPAIR      umbilical    HX HYSTERECTOMY       No family history on file. Social History     Tobacco Use    Smoking status: Never Smoker    Smokeless tobacco: Never Used   Substance Use Topics    Alcohol use: No        ROS:  Feeling well. No dyspnea or chest pain on exertion. No abdominal pain, change in bowel habits, black or bloody stools. No urinary tract symptoms. GYN ROS: reports some cyclic tenderness to breast. no breast pain or new or enlarging lumps on self exam, no vaginal bleeding, no discharge or pelvic pain. Menopausal symptoms: hot flashes intermittently but tolerable. No neurological complaints. Last DEXA scan and T-score: none  Last Colonoscopy:   Last Mammogram: Reports she went to get her 6 month follow up Mammogram for cysts at [address]  and they are requesting records of abnormal mammogram prior to doing mammo, in process of requesting records from previous facility     Objective:     Visit Vitals  /82   Pulse 64   Temp 98.2 °F (36.8 °C)   Resp 12   Ht 5' 5\" (1.651 m)   Wt 221 lb 3.2 oz (100.3 kg)   SpO2 99%   BMI 36.81 kg/m²     The patient appears well, alert, oriented x 3, in no distress. ENT normal.  Neck supple. No adenopathy or thyromegaly. ZOHAIB.  Lungs are clear, good air entry, no wheezes, rhonchi or rales. S1 and S2 normal, no murmurs, regular rate and rhythm. Abdomen soft without tenderness, guarding, mass or organomegaly. Extremities show no edema, normal peripheral pulses. Neurological is normal, no focal findings. BREAST EXAM: breasts appear normal, no suspicious masses, no skin or nipple changes or axillary nodes    PELVIC EXAM: VULVA: normal appearing vulva with no masses, tenderness or lesions, VAGINA: vaginal discharge - white and creamy, CERVIX: surgically absent, UTERUS: surgically absent, vaginal cuff well healed, ADNEXA: normal adnexa in size, nontender and no masses, RECTAL: normal rectal, no masses, guaiac negative stool obtained, PAP: Pap smear done today, DNA probe for chlamydia and GC obtained, exam chaperoned by Krystle Crawley MA    Assessment/Plan:   well woman  postmenopausal  mammogram  pap smear  return annually or prn    ICD-10-CM ICD-9-CM    1. Well woman exam with routine gynecological exam Z01.419 V72.31 PAP, LB, RFX HPV VIVMZ([id number])      BV+CT+NG+TV BY IRVING + YEAST      AMB POC FECAL BLOOD, OCCULT, QL 1 CARD   2. Breast cancer screening Z12.31 V76.10     Pending getting records from previous imaging center prior to getting repeat mammo at [facility]. Diagnoses and all orders for this visit:    1. Well woman exam with routine gynecological exam  -     PAP, LB, RFX HPV IQAVE([id number]); Future  -     BV+CT+NG+TV BY IRVING + YEAST; Future  -     AMB POC FECAL BLOOD, OCCULT, QL 1 CARD    2. Breast cancer screening  Comments:  Pending getting records from previous imaging center prior to getting repeat mammo at [facility]. Follow-up Disposition:  Return in about 6 months (around 5/1/2019), or if symptoms worsen or fail to improve. Wendy Hutchinson

## 2018-11-04 LAB
BACTERIAL SIALIDASE SPEC QL: POSITIVE
C TRACH RRNA VAG QL NAA+PROBE: NEGATIVE
N GONORRHOEA RRNA VAG QL NAA+PROBE: NEGATIVE
SPECIMEN SOURCE: ABNORMAL
T VAGINALIS RRNA VAG QL NAA+PROBE: NEGATIVE
YEAST GENITAL QL CULT: NEGATIVE

## 2018-11-05 NOTE — PROGRESS NOTES
Please notify patient that her pap was normal and negative for STDs with the exception of BV. Bacterial Vaginosis is a mild infection of the vagina caused by bacteria. Normally, there are a lot of \"good\" bacteria and some \"bad\" bacteria in the vagina. The good types help control the growth of the bad types. In women with bacterial vaginosis, the balance is upset.    I will treat with abx for 7 days, please let me know what pharmacy she would like to use, she may need good RX as well

## 2018-12-03 ENCOUNTER — TELEPHONE (OUTPATIENT)
Dept: FAMILY MEDICINE CLINIC | Age: 53
End: 2018-12-03

## 2018-12-03 NOTE — TELEPHONE ENCOUNTER
Scheduled patient for mammogram/US at [facility] Wed Dec 19, 2018 at 2 pm for mammogram and 2:30 for US. Patient to check in at 1:45 pm with photo ID, shower and not powder, deodorant or lotion. Patient's # mailbox full, no answer. Will try to contact tomorrow.

## 2018-12-03 NOTE — TELEPHONE ENCOUNTER
Patient calling because she wants to schedule her mamogram done day. We received notes from her previous doctors so if special changes needed please make therm.

## 2018-12-19 DIAGNOSIS — N76.0 BV (BACTERIAL VAGINOSIS): Primary | ICD-10-CM

## 2018-12-19 DIAGNOSIS — B96.89 BV (BACTERIAL VAGINOSIS): Primary | ICD-10-CM

## 2018-12-19 RX ORDER — METRONIDAZOLE 500 MG/1
500 TABLET ORAL 2 TIMES DAILY
Qty: 14 TAB | Refills: 0 | Status: SHIPPED | OUTPATIENT
Start: 2018-12-19 | End: 2018-12-26

## 2018-12-19 NOTE — PROGRESS NOTES
Called patient to let her know that she has BV which is not a STD but she will need treatment. Patient says she would like her rx sent to [address] [place] [address].

## 2018-12-20 NOTE — PROGRESS NOTES
Called patient to see if she picked up her medication that was sent to the pharmacy. Patient was not available.

## 2018-12-31 ENCOUNTER — HOSPITAL ENCOUNTER (EMERGENCY)
Age: 53
Discharge: HOME | End: 2018-12-31
Attending: EMERGENCY MEDICINE
Payer: SELF-PAY

## 2018-12-31 VITALS
BODY MASS INDEX: 33.78 KG/M2 | HEART RATE: 82 BPM | SYSTOLIC BLOOD PRESSURE: 141 MMHG | DIASTOLIC BLOOD PRESSURE: 95 MMHG | OXYGEN SATURATION: 99 % | TEMPERATURE: 98.6 F | RESPIRATION RATE: 16 BRPM | WEIGHT: 203 LBS

## 2018-12-31 DIAGNOSIS — R10.2 PELVIC PAIN: ICD-10-CM

## 2018-12-31 DIAGNOSIS — B96.89 BV (BACTERIAL VAGINOSIS): Primary | ICD-10-CM

## 2018-12-31 DIAGNOSIS — N76.0 BV (BACTERIAL VAGINOSIS): Primary | ICD-10-CM

## 2018-12-31 LAB
APPEARANCE UR: NORMAL
BILIRUB UR QL: NEGATIVE
COLOR UR: YELLOW
GLUCOSE UR STRIP.AUTO-MCNC: NEGATIVE MG/DL
HGB UR QL STRIP: NEGATIVE
KETONES UR QL STRIP.AUTO: NEGATIVE MG/DL
LEUKOCYTE ESTERASE UR QL STRIP.AUTO: NEGATIVE
NITRITE UR QL STRIP.AUTO: NEGATIVE
PH UR STRIP: 5.5 [PH] (ref 5–8)
PROT UR STRIP-MCNC: NEGATIVE MG/DL
SERVICE CMNT-IMP: NORMAL
SP GR UR REFRACTOMETRY: 1.02 (ref 1–1.03)
UROBILINOGEN UR QL STRIP.AUTO: 0.2 EU/DL (ref 0.2–1)
WET PREP GENITAL: NORMAL

## 2018-12-31 PROCEDURE — 87210 SMEAR WET MOUNT SALINE/INK: CPT

## 2018-12-31 PROCEDURE — 81003 URINALYSIS AUTO W/O SCOPE: CPT

## 2018-12-31 PROCEDURE — 74011250637 HC RX REV CODE- 250/637: Performed by: EMERGENCY MEDICINE

## 2018-12-31 PROCEDURE — 87086 URINE CULTURE/COLONY COUNT: CPT

## 2018-12-31 PROCEDURE — 99284 EMERGENCY DEPT VISIT MOD MDM: CPT

## 2018-12-31 RX ORDER — METRONIDAZOLE 500 MG/1
500 TABLET ORAL 2 TIMES DAILY
Qty: 14 TAB | Refills: 0 | Status: SHIPPED | OUTPATIENT
Start: 2018-12-31 | End: 2019-01-07

## 2018-12-31 RX ORDER — HYDROCODONE BITARTRATE AND ACETAMINOPHEN 5; 325 MG/1; MG/1
1 TABLET ORAL
Status: COMPLETED | OUTPATIENT
Start: 2018-12-31 | End: 2018-12-31

## 2018-12-31 RX ORDER — HYDROCODONE BITARTRATE AND ACETAMINOPHEN 5; 325 MG/1; MG/1
1 TABLET ORAL
Qty: 6 TAB | Refills: 0 | Status: SHIPPED | OUTPATIENT
Start: 2018-12-31 | End: 2018-12-31

## 2018-12-31 RX ADMIN — HYDROCODONE BITARTRATE AND ACETAMINOPHEN 1 TABLET: 5; 325 TABLET ORAL at 09:12

## 2018-12-31 NOTE — LETTER
NOTIFICATION RETURN TO WORK / SCHOOL 
 
12/31/2018 12:50 PM 
 
Ms. Crystal Jason [facility] [address] [address] To Whom It May Concern: Crystal Jason is currently under the care of [facility]. She will return to work/school on: 01/01/19 If there are questions or concerns please have the patient contact our office. Sincerely, Rae Acuna Rn

## 2018-12-31 NOTE — DISCHARGE INSTRUCTIONS
Bacterial Vaginosis: Care Instructions  Your Care Instructions    Bacterial vaginosis is a type of vaginal infection. It is caused by excess growth of certain bacteria that are normally found in the vagina. Symptoms can include itching, swelling, pain when you urinate or have sex, and a gray or yellow discharge with a \"fishy\" odor. It is not considered an infection that is spread through sexual contact. Although symptoms can be annoying and uncomfortable, bacterial vaginosis does not usually cause other health problems. However, if you have it while you are pregnant, it can cause complications. While the infection may go away on its own, most doctors use antibiotics to treat it. You may have been prescribed pills or vaginal cream. With treatment, bacterial vaginosis usually clears up in 5 to 7 days. Follow-up care is a key part of your treatment and safety. Be sure to make and go to all appointments, and call your doctor if you are having problems. It's also a good idea to know your test results and keep a list of the medicines you take. How can you care for yourself at home? · Take your antibiotics as directed. Do not stop taking them just because you feel better. You need to take the full course of antibiotics. · Do not eat or drink anything that contains alcohol if you are taking metronidazole (Flagyl). · Keep using your medicine if you start your period. Use pads instead of tampons while using a vaginal cream or suppository. Tampons can absorb the medicine. · Wear loose cotton clothing. Do not wear nylon and other materials that hold body heat and moisture close to the skin. · Do not scratch. Relieve itching with a cold pack or a cool bath. · Do not wash your vaginal area more than once a day. Use plain water or a mild, unscented soap. Do not douche. When should you call for help?   Watch closely for changes in your health, and be sure to contact your doctor if:    · You have unexpected vaginal bleeding.     · You have a fever.     · You have new or increased pain in your vagina or pelvis.     · You are not getting better after 1 week.     · Your symptoms return after you finish the course of your medicine. Where can you learn more? Go to http://yanira-deysi.info/. Madalyn Apley in the search box to learn more about \"Bacterial Vaginosis: Care Instructions. \"  Current as of: May 15, 2018  Content Version: 11.8  © 5299-1038 Healthwise, [employer]. Care instructions adapted under license by [employer] (which disclaims liability or warranty for this information). If you have questions about a medical condition or this instruction, always ask your healthcare professional. [address] any warranty or liability for your use of this information.

## 2018-12-31 NOTE — ED TRIAGE NOTES
\"I was recently treated for a bacteria infections. Yesterday I started having pain in my lower abd. When I got up at 0300 today I felt really light headed\"

## 2019-01-01 LAB
BACTERIA SPEC CULT: ABNORMAL
BACTERIA SPEC CULT: ABNORMAL
SERVICE CMNT-IMP: ABNORMAL

## 2019-01-02 NOTE — PROGRESS NOTES
Called patient to see if she picked up her medication that was sent to the pharmacy in November. Patient says she picked it up but went to the ER because the infection had not cleared and she showed them the medication we gave her, they gave her another medication.

## 2019-01-03 ENCOUNTER — TELEPHONE (OUTPATIENT)
Dept: FAMILY MEDICINE CLINIC | Age: 54
End: 2019-01-03

## 2019-01-03 NOTE — TELEPHONE ENCOUNTER
Patient states that she is still having vaginal issues and is in pain. She stated that she went to the ER and was given more medication, but she is still experiencing some problem and pain.

## 2019-01-07 ENCOUNTER — TELEPHONE (OUTPATIENT)
Dept: FAMILY MEDICINE CLINIC | Age: 54
End: 2019-01-07

## 2019-01-07 NOTE — TELEPHONE ENCOUNTER
Medication: Proventil HFA, dose: 2 puffs, how often: every 4 hours as needed for wheezing, current number of medication days provided: 90, refill per application. Lot# [id number], EXP 01/2020 . This medication was received and verified for the following 1. Correct Patient, 2. Correct Diagnosis, 3. Correct Drug, 4. Correct route, and no current allergy to medication. Medication: Asamanex 220 mcg mcg , dose: 1 puff, how often: QD , current number of medication days provided: 90, refill per application. Lot #: [id number], EXP 01/2020. This medication was received and verified for the following 1. Correct Patient, 2. Correct Diagnosis, 3. Correct Drug, 4. Correct route, and no current allergy to medication. Please contact patient to come  their medications.      Earl Byrnes, MSN, RN, FNP-C     [facility]

## 2019-01-15 ENCOUNTER — HOSPITAL ENCOUNTER (OUTPATIENT)
Dept: ULTRASOUND IMAGING | Age: 54
Discharge: HOME | End: 2019-01-15
Attending: NURSE PRACTITIONER

## 2019-01-15 ENCOUNTER — HOSPITAL ENCOUNTER (OUTPATIENT)
Dept: MAMMOGRAPHY | Age: 54
Discharge: HOME | End: 2019-01-15
Attending: NURSE PRACTITIONER

## 2019-01-15 PROCEDURE — 77066 DX MAMMO INCL CAD BI: CPT

## 2019-01-15 PROCEDURE — 76642 ULTRASOUND BREAST LIMITED: CPT

## 2019-05-01 ENCOUNTER — TELEPHONE (OUTPATIENT)
Dept: FAMILY MEDICINE CLINIC | Age: 54
End: 2019-05-01

## 2019-05-02 NOTE — TELEPHONE ENCOUNTER
Medication: Proventil HFA, dose: 2 puffs, how often: every 4 hours as needed for wheezing, current number of medication days provided: 90, refill per application. Lot# Lot #[id number], EXP 05/2020 . This medication was received and verified for the following 1. Correct Patient, 2. Correct Diagnosis, 3. Correct Drug, 4. Correct route, and no current allergy to medication.

## 2019-05-02 NOTE — TELEPHONE ENCOUNTER
Medication: Asamanex 220mcg , dose: 1 puff, how often: BID , current number of medication days provided: 90, refill per application. Lot #: Lot #[id number], EXP 05/2020. This medication was received and verified for the following 1. Correct Patient, 2. Correct Diagnosis, 3. Correct Drug, 4. Correct route, and no current allergy to medication.

## 2019-05-06 ENCOUNTER — PATIENT OUTREACH (OUTPATIENT)
Dept: FAMILY MEDICINE CLINIC | Age: 54
End: 2019-05-06

## 2019-05-19 NOTE — ED PROVIDER NOTES
EMERGENCY DEPARTMENT HISTORY AND PHYSICAL EXAM 
 
8:32 AM 
 
 
Date: 12/31/2018 Patient Name: Ortiz Lagos History of Presenting Illness Chief Complaint Patient presents with  Abdominal Pain  Dizziness History Provided By: Patient Chief Complaint: Pelvic Pain Duration: 1 Days Timing:  Constant Location: Pelvic Quality: Rahul Milder Severity: Moderate Modifying Factors: N/A Associated Symptoms: light-headedness Additional History (Context): Ortiz Lagos is a 47 y.o. female with a PMHx of HTN, H. pylori infection, asthma, and other noted PMHx who presents with constant, moderate, sharp pelvic pain onset x 1 day ago with associated light-headedness. Pt reports she was seen about Raiing and had pap smear done. After the appointment she was contacted by the office to begin Abx for a bacterial infection she is currently taking. PT denies dysuria, vaginal bleeding, and vaginal discharge. No further concerns or complaints at this time. PCP: Víctor Medina NP Past History Review of Systems Review of Systems Constitutional: Negative for fever. Genitourinary: Positive for pelvic pain. Negative for dysuria, vaginal bleeding and vaginal discharge. All other systems reviewed and are negative. Physical Exam  
 
 
 
Physical Exam  
Constitutional: She is oriented to person, place, and time. She appears well-developed. HENT:  
Head: Normocephalic and atraumatic. Eyes: EOM are normal. Pupils are equal, round, and reactive to light. Neck: Normal range of motion. Neck supple. Cardiovascular: Normal rate, regular rhythm and normal heart sounds. Exam reveals no friction rub. No murmur heard. Pulmonary/Chest: Effort normal and breath sounds normal. No respiratory distress. She has no wheezes. Abdominal: Soft. She exhibits no distension. There is no tenderness. There is no rebound and no guarding. Genitourinary: Genitourinary Comments: Nl external female genetalia No cmt or adnexal tenderness Scant white discharge. [place] Musculoskeletal: Normal range of motion. Neurological: She is alert and oriented to person, place, and time. Skin: Skin is warm and dry. Psychiatric: She has a normal mood and affect. Her behavior is normal. Thought content normal.  
 
 
 
Diagnostic Study Results Medical Decision Making I am the first provider for this patient. I reviewed the vital signs, available nursing notes, past medical history, past surgical history, family history and social history. Vital Signs-Reviewed the patient's vital signs. ED Course: Progress Notes, Reevaluation, and Consults: 
 
Provider Notes (Medical Decision Making): 1. Pelvic pain; check ua/ exam. UA neg 
+ clue cells + suprapubic pain ; really above bladder at bikini line; rest of abd exam neg;  exam no sig ttp Will tx BV and have pt recheck 1-2 days with ob/pcp or ed, No imaging based on HPI/exam/labs at this point. Diagnosis Clinical Impression: No diagnosis found. _______________________________ Attestations: 
Scribe Attestation Aguila Doe acting as a scribe for and in the presence of Naty Bassett MD     
December 31, 2018 at 8:32 AM 
    
Provider Attestation:     
I personally performed the services described in the documentation, reviewed the documentation, as recorded by the scribe in my presence, and it accurately and completely records my words and actions. December 31, 2018 at 8:32 AM - Naty Bassett MD   
_______________________________ (4) no limitations

## 2019-06-21 NOTE — PATIENT INSTRUCTIONS
High Cholesterol: Care Instructions  Your Care Instructions  Cholesterol is a type of fat in your blood. It is needed for many body functions, such as making new cells. Cholesterol is made by your body. It also comes from food you eat. High cholesterol means that you have too much of the fat in your blood. This raises your risk of a heart attack and stroke. LDL and HDL are part of your total cholesterol. LDL is the \"bad\" cholesterol. High LDL can raise your risk for heart disease, heart attack, and stroke. HDL is the \"good\" cholesterol. It helps clear bad cholesterol from the body. High HDL is linked with a lower risk of heart disease, heart attack, and stroke. Your cholesterol levels help your doctor find out your risk for having a heart attack or stroke. You and your doctor can talk about whether you need to lower your risk and what treatment is best for you. A heart-healthy lifestyle along with medicines can help lower your cholesterol and your risk. The way you choose to lower your risk will depend on how high your risk is for heart attack and stroke. It will also depend on how you feel about taking medicines. Follow-up care is a key part of your treatment and safety. Be sure to make and go to all appointments, and call your doctor if you are having problems. It's also a good idea to know your test results and keep a list of the medicines you take. How can you care for yourself at home? · Eat a variety of foods every day. Good choices include fruits, vegetables, whole grains (like oatmeal), dried beans and peas, nuts and seeds, soy products (like tofu), and fat-free or low-fat dairy products. · Replace butter, margarine, and hydrogenated or partially hydrogenated oils with olive and canola oils. (Canola oil margarine without trans fat is fine.)  · Replace red meat with fish, poultry, and soy protein (like tofu). · Limit processed and packaged foods like chips, crackers, and cookies.   · Bake, broil, or steam foods. Don't murdock them. · Be physically active. Get at least 30 minutes of exercise on most days of the week. Walking is a good choice. You also may want to do other activities, such as running, swimming, cycling, or playing tennis or team sports. · Stay at a healthy weight or lose weight by making the changes in eating and physical activity listed above. Losing just a small amount of weight, even 5 to 10 pounds, can reduce your risk for having a heart attack or stroke. · Do not smoke. When should you call for help? Watch closely for changes in your health, and be sure to contact your doctor if:  · You need help making lifestyle changes. · You have questions about your medicine. Where can you learn more? Go to http://yanira-deysi.info/. Enter G472 in the search box to learn more about \"High Cholesterol: Care Instructions. \"  Current as of: January 27, 2016  Content Version: 11.1  © 5670-3563 [employer]. Care instructions adapted under license by [employer] (which disclaims liability or warranty for this information). If you have questions about a medical condition or this instruction, always ask your healthcare professional. [place] [address] any warranty or liability for your use of this information. [facility] reminders! Foundation Operating Hours: These may change without notice. Mon- Wed 7am to 5pm. Closed for lunch 12-1pm  Thurs 7am to 12pm  Fridays closed     NO SHOW POLICY ~ If a patient has 3 no shows for an appointment with the Provider, Mental Health Provider, or the Nurse Navigator in 6 months, they will be discharged from the practice for 6 months. Medication ordering will also be suspended. If the patient is discharged from the [place], they can go to the [place] [address] where they can be seen for the primary needs plus obtain the same types of medications as they receive at the [place]. To avoid being discharged, the patient must call the office at [phone] 24 hours prior to their appointment if they need to cancel, arrive to their appointments on time and come to all scheduled appointments. If the patient is discharged from the practice, they can apply to be re-established after 12 months. Lab work:  Unless you are instructed differently, please return to the office between the hours of 7 am and 10:30 am Monday through Thursday to have your labs drawn one week before your next scheduled PROVIDER visit. If you do not have an appointment to follow up on these results, please make one or plan to call the office if you do not hear from us to get the results. No news does not mean good news. Medications: If your medications are new or have changed, and you get your medications from the clinic pharmacy ([facility]), you MUST talk to the pharmacy staff to sign the new prescription applications. If you don't sign the applications we cannot get the medications for you. It usually takes 6-8 weeks for your medications to arrive. The Pharmacy staff will call you when your medications are available. You will have 30 days to come in and  your medications. If you don't  your medicines within those 30 days, those medicines will be placed on the self as samples and you will have to start all over again by completing the applications and waiting the 6-8 weeks for your medicines to arrive. This is firm and there will be no exceptions! ! The [facility] or [facility] will assist you with your medications if available but not all medications are available so some may be obtained through local pharmacies such as [facility] that has a large $4 list and [facility] that has many drugs for free or also for $4.  We will work hard to get the best medications for you that you can also afford.         Feet Care: Local ministry through [facility]  Every second Tuesday of the month (except for holidays and election days) from 9am to 1 pm. The services provided by these ministry volunteers are free of charge with the option to donate. They will inspect your feet thoroughly, soak them for 10 minutes, cut and file your nails. They care for diabetics as well. Keep in mind this service is free and will be on a first come first serve basis. Bad teeth? Ask about the Dental Bus to get you in front of a local dentist. The bus leaves every other Wednesday for those on the list. (Ask about availability as these appointments are limited)    Eye exams for Diabetics. Please let us know so we can add you to the list to see the eye doctor at [facility]. You will receive a free eye exam and free glasses if needed. Unfortunately, if you are not a diabetic, we do not have a free service for eye exams for you (yet!). We do have information on where to go to get a huge discount on eye exams and glasses. Sick visits: If you are sick and it is not an emergency call the office to see if you can schedule an appointment. Charges and cost items from the clinic:  Most of our orders are covered by [employer] [place] [address] [place] but there ARE SOME CHARGES for items such as radiology interpretations and anesthesiology during procedures and surgeries. Please make sure you have contacted the Advanced Patient Advocacy (APA) group to check on your payment options: [email or web address]. or come in and talk to them in person: On  Tuesdays, we have Advanced Patient Advocacy at the Clinic from [address] - 11:30pm and 1pm - 3pm. If you can't make it to the clinic on Tuesdays during their operating hours then APA is available Mon - Fri 8-4pm at [facility] on the first floor by the information desk. Their number is [phone]. It is important that you are screened in order to qualify for assistance and to avoid huge medical charges.  The [facility] is not responsible for ANY charges you may accrue regardless of who ordered the medication, procedure, treatment or test. If you go to the Emergency Room, you WILL be charged! Behavior and emotional issues! It is stressful to be sick, have an illness, take medications, not have a job, not have medical insurance, have family issues or just getting older! Schedule an appointment with our mental health provider. She is in the office Mondays and Wednesdays from 8am to [address] [facility] can also contact the following: The national suicide hotline (5-666-868-EKTR or 9-834.381.4511)    Teena [address] [facility], [address] [place] [address]  [phone]    [facility] - [address] [place] [address] [address] [facility], [address] [facility]   [phone]            Immunizations/Vaccination  Routine Immunizations are provided for infants, children, teens, and adults at the [facility]. Please bring all immunization records with you and present them at the time of registration. Phone [phone] extension [phone]  Hours (Walk in)  Monday, Tuesday, Wednesday and Friday  8:00 AM to 11:00 AM  12:30 PM to 3:00 PM      Drug and Alcohol Addiction Issues! It is hard to stop a poor habit but there is help out there. Please feel free to attend any other the following support groups to help you kick the habit or go to [facility] Emergency Department to be evaluated by the psychiatric team. Never give up!!     AlAnon meetings: [place], [place], [place]    [place] MONDAY 7:30 PM JUST FOR TODAY [facility] [facility] [address] 10:30 AM [employer]GIN[employer] Manny [place] [employer], ROOM [address] [place] [address] WEDNESDAY 8:00 PM [facility] [address] [facility] [address] 8:00  W [facility] [address] [facility] 8:00 PM LET IT BEGIN WITH ME AFNAVNEET [place] [address] [facility] 6;30 PM [place] PARENTS [employer] Parents [facility] [address] [facility] 236     [place] MONDAY 10:30 AM [employer] [employer] Manny [facility] [address] [facility] SATURDAY 8:00 PM [facility] SATURDAY NIGHT AFG Manny [facility] 96 [facility] MONDAY 7:00 PM MONDAY NIGHT AFG Manny [facility] [address] [facility] WEDNESDAY 8:00 PM SEREN[employer] SEEK[employer] Manny [facility]